# Patient Record
Sex: FEMALE | Race: WHITE | NOT HISPANIC OR LATINO | Employment: OTHER | URBAN - METROPOLITAN AREA
[De-identification: names, ages, dates, MRNs, and addresses within clinical notes are randomized per-mention and may not be internally consistent; named-entity substitution may affect disease eponyms.]

---

## 2017-03-23 ENCOUNTER — OFFICE VISIT (OUTPATIENT)
Dept: BEHAVIORAL HEALTH | Facility: PHYSICIAN GROUP | Age: 82
End: 2017-03-23
Payer: MEDICARE

## 2017-03-23 VITALS — BODY MASS INDEX: 23.6 KG/M2 | WEIGHT: 125 LBS | HEIGHT: 61 IN | RESPIRATION RATE: 14 BRPM

## 2017-03-23 DIAGNOSIS — F03.90 MAJOR NEUROCOGNITIVE DISORDER (HCC): ICD-10-CM

## 2017-03-23 DIAGNOSIS — F29 PSYCHOSIS, UNSPECIFIED PSYCHOSIS TYPE (HCC): ICD-10-CM

## 2017-03-23 PROCEDURE — 4004F PT TOBACCO SCREEN RCVD TLK: CPT | Mod: 8P | Performed by: NURSE PRACTITIONER

## 2017-03-23 PROCEDURE — 90792 PSYCH DIAG EVAL W/MED SRVCS: CPT | Performed by: NURSE PRACTITIONER

## 2017-03-23 PROCEDURE — G8432 DEP SCR NOT DOC, RNG: HCPCS | Performed by: NURSE PRACTITIONER

## 2017-03-23 RX ORDER — LATANOPROST 50 UG/ML
1 SOLUTION/ DROPS OPHTHALMIC
Refills: 0 | Status: ON HOLD | COMMUNITY
Start: 2017-02-07 | End: 2020-01-17

## 2017-03-23 RX ORDER — ACETAMINOPHEN 500 MG
500-1000 TABLET ORAL EVERY 6 HOURS PRN
Status: ON HOLD | COMMUNITY
End: 2018-07-04

## 2017-03-23 RX ORDER — ALPRAZOLAM 0.25 MG/1
.25-.375 TABLET ORAL NIGHTLY PRN
Qty: 45 TAB | Refills: 1 | Status: SHIPPED | OUTPATIENT
Start: 2017-03-23 | End: 2017-05-04

## 2017-03-23 RX ORDER — NIFEDIPINE 60 MG/1
TABLET, FILM COATED, EXTENDED RELEASE ORAL
Refills: 0 | Status: ON HOLD | COMMUNITY
Start: 2017-01-06 | End: 2018-07-04

## 2017-03-23 RX ORDER — ALPRAZOLAM 0.25 MG/1
TABLET ORAL
Refills: 0 | COMMUNITY
Start: 2017-02-17 | End: 2017-03-23 | Stop reason: SDUPTHER

## 2017-03-23 RX ORDER — MULTIVITAMIN WITH IRON
TABLET ORAL
Status: ON HOLD | COMMUNITY
End: 2018-07-04

## 2017-03-23 NOTE — PROGRESS NOTES
"BEHAVIORAL HEALTH  INITIAL PSYCHIATRIC EVALUATION    Name: Aileen Reis  MRN: [id number]  : 1927  Age: 89 y.o.  Date of assessment: 3/23/2017  PCP: Zaira Pt States None  Persons in attendance:Patient, son and daughter in law  Total face-to-face time: 60 minutes    CHIEF COMPLAINT AND HISTORY OF PRESENTING PROBLEM:   (As stated by Patient, son, and daughter in law)  Aileen Reis is a 89 y.o., Unknown female referred for assessment by No ref. provider found. Primary presenting issue includes: patient has become paranoid at night, believes people are coming into home and stealing from her.    HISTORY OF PRESENT ILLNESS:    Patient's son and daughter in law report she has problems during the night, will wake up and be frightened thinking there are people coming in and moving her things, becomes agitated and has a difficult time calming down. Symptoms have worsened since October, patient did have some prior similar symptoms for the past 2 years. Currently living with son and daughter in law. Son states he can rationalize with his mom when she is agitated, but it is making everyone have a disturbed night as she is not sleeping more than 4 hours. PCP prescribed Xanax 0.25mg, has helped patient stay asleep longer, sleeping 4 hours at a time but episodes continue. Somewhat bizarre delusions at night that people are physically coming in through the computer into the home. Frequency of episodes have increased, they are looking for a way to get her some relief in the night. Denies any symptoms of paranoia or hallucinations or delusions during the day. Patient denies feeling sad, has not been treated for mental health problems in the past. Patient states she had a good life, is not sad but does not want to be a burden for her kids or end up in a convalescent home. Patient, son, and daughter in law report she has some memory problems, but that she is \"good for almost 89 years old\". Deny problems with making poor decisions, " is not wandering or becoming agitated during the day. Patient reports her appetite is good, enjoys bowling and has started bowling again after a hip fracture. No suicidal thoughts. No history of siva or hypomania, has not had any problems with disorganized thinking or psychosis until 2 years ago.     CURRENT PSYCHIATRIC MEDS:  Xanax 0.25 mg hs prn    PAST PSYCHIATRIC MEDICATIONS TRIED:  none  FAMILY/SOCIAL HISTORY:  Does patient/parent report a family history of behavioral health issues, diagnoses, or treatment? mom had severe mental illness, likely schizophrenia, was in and out of institutions since age 30. sister has schizophrenia, brothers dementia (one Alzheimers), one sister committed suicide and one brother committed suicide.  Family History   Problem Relation Age of Onset   • Heart Attack Father    • Psychiatry Mother      in/out mental institutions   • Suicide Attempts Sister    • Psychiatry Sister    • Heart Disease Brother    • Alzheimer's Disease Brother      Current living situation/household members: lives with son Bartolo and daughter in law Sonia.  in 1991, was  for 40 years  Relevant family history/structure/dynamics: patient born in [place]. She was oldest child of 9 children, mom was severely mentally ill and patient took care of her. Father was okay, no abuse or trauma.   Quality/quantity of current family and/or social support: supportive family, very supportive   Social History     Social History   • Marital Status:      Spouse Name: N/A   • Number of Children: N/A   • Years of Education: N/A     Occupational History   • Not on file.     Social History Main Topics   • Smoking status: Unknown If Ever Smoked   • Smokeless tobacco: Not on file   • Alcohol Use: No   • Drug Use: No   • Sexual Activity: Not on file     Other Topics Concern   • Not on file     Social History Narrative       Religion PRACTICE/PREFERENCE: none    EMPLOYMENT/RESOURCES  Is the patient currently  employed? No  Does the patient/parent report adequate financial resources? Yes  Does patient identify impact of presenting issue on work functioning? n/a  Work or income-related stressors:  N/a       HISTORY:  Does patient report current or past enlistment? No    [If yes, trigger below section]  Does patient report history of exposure to combat? No  Does patient report history of  sexual trauma? No  Does patient report other -related stressors? No    SPIRITUAL/CULTURAL/IDENTITY:  What are the patient’s/family’s spiritual beliefs or practices? none  What is the patient’s cultural or ethnic background/identity? /[place]  How does the patient identify their sexual orientation?    How does the patient identify their gender? female  Does the patient identify any spiritual/cultural/identity factors as relevant to the presenting issue? son verbalizes Italians are always worried about someone stealing their things      PSYCHIATRIC TREATMENT HISTORY:  Does patient/parent report a history of outpatient psychiatric treatment for patient?   No:     Does patient/parent report a history of psychiatric hospitalizations for patient?  No:    Does patient/parent report a history of psychotherapy or other behavioral health treatment for patient?   No:      PAST MEDICAL HISTORY:    Pacemaker, history of Afib, broken femur, glaucoma      REVIEW OF SYSTEMS:      General appearance:  female, appears stated age. Stooped posture, ambulates slowly with cane. Good grooming/hygiene. 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   Ears/Nose/Mouth/Throat positive - hard of hearing   Cardiovascular positive - pacemaker   Respiratory negative   Gastrointestinal negative - denies GI upset or reduced appetite   Genitourinary negative   Muscular negative   Integumentary negative   Neurological negative - denies head injuries or seizure  history   Endocrine negative - no diabetes or thyroid problem   Hematologic/Lymphatic negative - denies history of clots or anemia        Past Medical History   Diagnosis Date   • Hypertension    • A-fib (CMS-HCC)    • Pacemaker 2015   • Syncope 2015       LABS REVIEWED: none available    Medication Allergies  Review of patient's allergies indicates no known allergies.    Medications (non psychiatric)  Current Outpatient Prescriptions   Medication Sig Dispense Refill   • Magnesium 250 MG Tab Take  by mouth.     • acetaminophen (TYLENOL) 500 MG Tab Take 500-1,000 mg by mouth every 6 hours as needed.     • vitamin D (CHOLECALCIFEROL) 1000 UNIT Tab Take 1,000 Units by mouth every day.     • alprazolam (XANAX) 0.25 MG Tab Take 1-1.5 Tabs by mouth at bedtime as needed for Sleep. 45 Tab 1   • latanoprost (XALATAN) 0.005 % Solution   0   • NIFEDICAL XL 60 MG CR tablet   0   • sotalol (BETAPACE) 80 MG TABS Take 0.5 Tabs by mouth 2 Times a Day. 60 Tab 3   • travoprost (TRAVATAN Z) 0.004 % SOLN Place 1 Drop in left eye every evening. Indications: Wide-Angle Glaucoma     • dorzolamide-timolol (COSOPT) 22.3-6.8 MG/ML SOLN Place 1 Drop in left eye 2 times a day. Indications: Wide-Angle Glaucoma       No current facility-administered medications for this visit.       DEVELOPMENTAL HISTORY:  Delivery:patient has no history about mom's pregnancy with her, but does know mom was not mentally ill when she was born   Birth weight:   Prenatal complications:  unknown   complications:  unknonw   complications:  unknown  In utero substance exposure:  unknown    Reached developmental milestones within normal limits? No history known, patient assumes all was normal   Gross motor:  Yes  Fine motor:  Yes   Speech:  Yes   Social interaction:  Yes    EDUCATIONAL:  Is patient currently enrolled in a school/educational program?   Not in school. HS graduate      Psychiatric Review of Systems:   Mood: Other: denies  "depression or feeling sad  Anxiety: Other: becomes anxious and agitated when wakes at night  Sleep: Frequent waking  Psychomotor/Energy: Other: within normal limits  Eating/Appetite: Other: within normal limits, family says her appetite is great  Cognitive: Other: poor memory at times  Behavior: Other: agitation at night   Psychosis: Visual hallucinations, Paranoia and Other: believes she smells \"gas\" or other smells, family states not there  Social: Other: engages with family well  Sensory: Other: within normal limts       Other symptoms:     LEGAL HISTORY:  Has the patient ever been involved with juvenile, adult, or family legal systems? No    Does patient report ever committing a crime? No   Does patient report every being arrested? No  Does patient report ever being a victim of a crime? No  Does patient report involvement in any current legal issues?No  Does patient report any current agency (parole/probation/CPS/) involvement? No    ABUSE/NEGLECT SCREENING:  Does patient report feeling “unsafe” in his/her home, or afraid of anyone? paranoia at night of home being broken into  Does patient report any history of physical, sexual, or emotional abuse? No  Does parent or significant other report any of the above? No  Is there evidence of neglect by self?No  Is there evidence of neglect by a caregiver? No  Does the patient/parent report any history of CPS/APS/police involvement related to suspected abuse/neglect or domestic violence? No    Based on the information provided during the current assessment, is a mandated report of suspected abuse/neglect being made?   No    SAFETY ASSESSMENT - SELF:  Does patient acknowledge current or past symptoms of dangerousness to self? No    Does parent/significant other report patient has current or past symptoms of dangerousness to self? No      History of suicide by family member: brother and sister-suicide  History of suicide by friend/significant other: " "No    Recent change in amount/specificity/intensity of suicidal thoughts or self-harm behavior?No  Current access to firearms, medications, or other identified means of suicide/self-harm? No  If yes, willing to restrict access to means of suicide/self-harm? n/a  Protective factors present: Reasons for living identified by patient: family    Current Suicide Risk: Low  Safety Plan completed, documented in chart, and copy given to patient: n/a     Crisis Safety Plan completed and copy given to patient: No     SAFETY ASSESSMENT - OTHERS:  Does patient acknowledge current or past symptoms of aggressive behavior or risk to others? No  Does parent/significant other report patient has current or past symptoms of aggressive behavior or risk to others? No      Recent change in amount/specificity/intensity of thoughts or threats to harm others? No  Current access to firearms/other identified means of harm? n/a'  If yes, willing to restrict access to weapons/means of harm? No    Current Homicide Risk: Not applicable  Crisis safety Plan completed, documented in chart, and copy given to patient? No    Based on information provided during the current assessment, is a mandated \"duty to warn\" being exercised? No    SUBSTANCE USE/ADDICTION HISTORY:  [] Not applicable - patient 10 years of age or younger    Is there a family history of substance use/addiction? No  Does patient acknowledge or parent/significant other report use of/dependence on substances? No    What consequences does the patient associate with any of the above substance use and or addictive behaviors?   None  [x] Patient denies use of any substance/addictive behaviors    STRENGTHS/ASSETS:  Strengths Identified by interviewer: Family suppport and Social skills      MENTAL STATUS EXAM/OBSERVATIONS  Resp 14  Ht 1.549 m (5' 0.98\")  Wt 56.7 kg (125 lb)  BMI 23.63 kg/m2  Breastfeeding? No  Participation: Active verbal participation, Engaged and Guarded  Grooming:  " Casual  Orientation: oriented to person, place, situation. incorrectly gives date/day of week   Eye contact:  Good  Behavior: Tense   Mood:  Euthymic  Affect: Full range and Congruent with content  Thought process: Goal-directed  Thought content: Within normal limits  Speech: Rate within normal limits and Volume within normal limits  Perception: Within normal limits  Memory:  see screen below  Insight:  Adequate  Judgment: Adequate  Other:   Family/couple interaction observations: supportive family    RESULTS OF SCREENING MEASURES:  Attempt to administer SLUMS exam, but patient is not able to do visual questions 9,10,11.  Patient performs poorly on all other parts, see scan. Difficulty with date, calculations, immediate recall and several minute recall.    CLINICAL FORMULATION: 89 year old female, seems likely to be having dementia related psychosis in the evenings. Patient somewhat poor historian, family provides a lot of collateral information for her today. Family history strong for mental illness, mother was in/out of institutions (likely schizophrenia), sister schizophrenia, brothers with dementia and Alzheimers, 2 siblings committed suicide. Patient has signs of cognitive decline, family seems to believe it is normal for her age however.  Patient reports she has had no depression, psychosis, disorganized thinking, or any problems until about 2 years ago. Currently living with family, has good support. Patient has no agitation or psychosis during the day. Clear problems with memory, calculations. No substance abuse. Patient has history of pacemaker/Afib, broken femur, and glaucoma that has seriously impaired her vision, left eye nearly blind. Hard of hearing as well. Patient has history of Alzheimer's in family, possible Alzheimer's.       DIAGNOSTIC IMPRESSION(S):  1. Psychosis, unspecified psychosis type    2. Major neurocognitive disorder         IDENTIFIED NEEDS/PLAN:  -refer to neurology to memory  clinic.  -discussed with patient and family that sometimes medication can be useful to slow progression of dementia.  -family wants a med for night relief, discussed for now we will continue Xanax low dose to help her sleep. Educated family the increased risk of death with atypical antipsychotics in patient's with dementia. Encourage family to see neurology, will consider low dose trial of Seroquel if needed and will again review risks/benefits.  -risks/benefits of Xanax discussed with patient, fall precaution given patient age    Current outpatient prescriptions:   •  Magnesium 250 MG Tab, Take  by mouth., Disp: , Rfl:   •  acetaminophen (TYLENOL) 500 MG Tab, Take 500-1,000 mg by mouth every 6 hours as needed., Disp: , Rfl:   •  vitamin D (CHOLECALCIFEROL) 1000 UNIT Tab, Take 1,000 Units by mouth every day., Disp: , Rfl:   •  alprazolam (XANAX) 0.25 MG Tab, Take 1-1.5 Tabs by mouth at bedtime as needed for Sleep., Disp: 45 Tab, Rfl: 1  •  latanoprost (XALATAN) 0.005 % Solution, , Disp: , Rfl: 0  •  NIFEDICAL XL 60 MG CR tablet, , Disp: , Rfl: 0  •  sotalol (BETAPACE) 80 MG TABS, Take 0.5 Tabs by mouth 2 Times a Day., Disp: 60 Tab, Rfl: 3  •  travoprost (TRAVATAN Z) 0.004 % SOLN, Place 1 Drop in left eye every evening. Indications: Wide-Angle Glaucoma, Disp: , Rfl:   •  dorzolamide-timolol (COSOPT) 22.3-6.8 MG/ML SOLN, Place 1 Drop in left eye 2 times a day. Indications: Wide-Angle Glaucoma, Disp: , Rfl:      Does patient express agreement with the above plan? Yes

## 2017-05-04 ENCOUNTER — OFFICE VISIT (OUTPATIENT)
Dept: BEHAVIORAL HEALTH | Facility: PHYSICIAN GROUP | Age: 82
End: 2017-05-04
Payer: MEDICARE

## 2017-05-04 DIAGNOSIS — F03.90 MAJOR NEUROCOGNITIVE DISORDER (HCC): ICD-10-CM

## 2017-05-04 DIAGNOSIS — F29 PSYCHOSIS, UNSPECIFIED PSYCHOSIS TYPE (HCC): ICD-10-CM

## 2017-05-04 PROCEDURE — 4040F PNEUMOC VAC/ADMIN/RCVD: CPT | Mod: 8P | Performed by: NURSE PRACTITIONER

## 2017-05-04 PROCEDURE — 99214 OFFICE O/P EST MOD 30 MIN: CPT | Performed by: NURSE PRACTITIONER

## 2017-05-04 PROCEDURE — 4004F PT TOBACCO SCREEN RCVD TLK: CPT | Mod: 8P | Performed by: NURSE PRACTITIONER

## 2017-05-04 PROCEDURE — 1101F PT FALLS ASSESS-DOCD LE1/YR: CPT | Mod: 8P | Performed by: NURSE PRACTITIONER

## 2017-05-04 PROCEDURE — G8432 DEP SCR NOT DOC, RNG: HCPCS | Performed by: NURSE PRACTITIONER

## 2017-05-04 PROCEDURE — G8420 CALC BMI NORM PARAMETERS: HCPCS | Performed by: NURSE PRACTITIONER

## 2017-05-04 RX ORDER — POTASSIUM CHLORIDE 750 MG/1
TABLET, FILM COATED, EXTENDED RELEASE ORAL
Status: ON HOLD | COMMUNITY
Start: 2017-03-30 | End: 2018-07-04

## 2017-05-04 RX ORDER — LEVOFLOXACIN 750 MG/1
750 TABLET, FILM COATED ORAL
Refills: 0 | Status: ON HOLD | COMMUNITY
Start: 2017-04-25 | End: 2018-07-04

## 2017-05-04 RX ORDER — POTASSIUM CHLORIDE 1500 MG/1
40 TABLET, EXTENDED RELEASE ORAL 2 TIMES DAILY
Refills: 0 | Status: ON HOLD | COMMUNITY
Start: 2017-04-25 | End: 2018-07-10

## 2017-05-04 RX ORDER — FUROSEMIDE 40 MG/1
40 TABLET ORAL 2 TIMES DAILY
Refills: 0 | Status: ON HOLD | COMMUNITY
Start: 2017-04-29 | End: 2018-07-10

## 2017-05-04 NOTE — PROGRESS NOTES
[facility]  PSYCHIATRIC FOLLOW-UP NOTE    Name: Aileen Reis  MRN: [id number]  : 1927  Age: 89 y.o.  Date of assessment: 2017  PCP: Joyce Bynum M.D.  Persons in attendance: Patient, son, and daughter in law    REASON FOR VISIT/CHIEF COMPLAINT (as stated by Patient, son, and daughter in law):  Aileen Reis is a 89 y.o., Unknown female, attending follow-up appointment for psychosis and major neurocog disorder.      HISTORY OF PRESENT ILLNESS:    Patient reports she has not been having problems, that she goes to bed around 10 pm and wakes at 4, then goes back to bed until 6, takes nap in afternoon. Son and daughter in law reports she has been less frightened at night since last visit, is not waking them saying someone has stolen her things. Son believes Aileen is just no longer telling them, Aileen denies she is feeling scared or worried at night. Daughter in law reports Aileen has poor short term memory, often uses incorrect words for everyday objects. Family reports they are no longer giving her Xanax because it did not work. Family reports they continue to try to help Aileen reality check that nobody is breaking in to move her things, that likely she has simply misplaced/forgotten where she put things.     PSYCHOSOCIAL CHANGES SINCE PREVIOUS CONTACT:  No changes, still living at son's home      MEDICATION SIDE EFFECTS: n/a    REVIEW OF SYSTEMS:      General appearance: casually dressed  female, ambulates slowly with assistance of family. cooperative  Constitutional negative - no fever/chills   Eyes not tested   Ears/Nose/Mouth/Throat Hard of hearing   Cardiovascular not tested   Respiratory not tested   Gastrointestinal negative - patient and family report appetite has been good, no GI problems   Genitourinary not tested   Muscular not tested   Integumentary not tested   Neurological positive - see HPI   Endocrine not tested   Hematologic/Lymphatic not tested       PSYCHIATRIC  EXAMINATION/MENTAL STATUS  There were no vitals taken for this visit.  Participation: Limited verbal participation and Guarded  Grooming:Casual  Orientation: Alert  Eye contact: Limited  Behavior:somewhat irritable   Mood: mildly irritable  Affect: Congruent with content  Thought process: Goal-directed  Thought content:  Within normal limits  Speech: Rate within normal limits and Volume within normal limits  Perception:  Within normal limits  Memory:  Poor memory for chronology of events  Insight: Limited  Judgment: Adequate  Family/couple interaction observations:   Other:    Current risk:    Suicide: Not applicable   Homicide: Not applicable   Self-harm: Not applicable  Relapse: Not applicable  Other:   Crisis Safety Plan reviewed?No  If evidence of imminent risk is present, intervention/plan:    Medical Records/Labs/Diagnostic Tests Reviewed: none    Medical Records/Labs/Diagnostic Tests Ordered: none    DIAGNOSTIC IMPRESSION(S):  1. Major neurocognitive disorder    2. Psychosis, unspecified psychosis type           ASSESSMENT AND PLAN: psychosis improving, neurocog symptoms persist    MA confirmed referal to neuro is complete, family can call and schedule. Discussed trial of Aricept with family and patient, family does not seem to feel it would be helpful and Aileen does not want to take another medication. Discussed we can reconsider in the future, encourage visit with neurology. Will d/c Xanax as patient has not been taking it and family reports it was not helpful. Re-educate family about dementia, that it is not normal part of aging and that medication can help slow progression in some cases as well as help with behavioral disturbances.    No meds prescribed today.      Current outpatient prescriptions:   •  furosemide (LASIX) 40 MG Tab, Take 80 mg by mouth., Disp: , Rfl: 0  •  levofloxacin (LEVAQUIN) 750 MG tablet, Take 750 mg by mouth., Disp: , Rfl: 0  •  potassium Chloride ER (K-TAB) 20 MEQ Tab CR tablet,  Take 20 mEq by mouth., Disp: , Rfl: 0  •  potassium chloride ER (KLOR-CON) 10 MEQ tablet, , Disp: , Rfl:   •  latanoprost (XALATAN) 0.005 % Solution, , Disp: , Rfl: 0  •  NIFEDICAL XL 60 MG CR tablet, , Disp: , Rfl: 0  •  Magnesium 250 MG Tab, Take  by mouth., Disp: , Rfl:   •  acetaminophen (TYLENOL) 500 MG Tab, Take 500-1,000 mg by mouth every 6 hours as needed., Disp: , Rfl:   •  vitamin D (CHOLECALCIFEROL) 1000 UNIT Tab, Take 1,000 Units by mouth every day., Disp: , Rfl:   •  sotalol (BETAPACE) 80 MG TABS, Take 0.5 Tabs by mouth 2 Times a Day., Disp: 60 Tab, Rfl: 3  •  travoprost (TRAVATAN Z) 0.004 % SOLN, Place 1 Drop in left eye every evening. Indications: Wide-Angle Glaucoma, Disp: , Rfl:   •  dorzolamide-timolol (COSOPT) 22.3-6.8 MG/ML SOLN, Place 1 Drop in left eye 2 times a day. Indications: Wide-Angle Glaucoma, Disp: , Rfl:       More than 50% of face-to-face time in this 25 minute visit was spent in psychotherapy/counseling.    Topics addressed include:    NELY Patel

## 2018-07-04 ENCOUNTER — HOSPITAL ENCOUNTER (INPATIENT)
Facility: MEDICAL CENTER | Age: 83
LOS: 9 days | DRG: 602 | End: 2018-07-13
Attending: INTERNAL MEDICINE | Admitting: INTERNAL MEDICINE
Payer: MEDICARE

## 2018-07-04 ENCOUNTER — HOSPITAL ENCOUNTER (OUTPATIENT)
Facility: MEDICAL CENTER | Age: 83
DRG: 602 | End: 2018-07-04
Payer: MEDICARE

## 2018-07-04 DIAGNOSIS — Z86.79 HISTORY OF HEART BLOCK: ICD-10-CM

## 2018-07-04 DIAGNOSIS — R54 FRAILTY: ICD-10-CM

## 2018-07-04 PROBLEM — F03.90 DEMENTIA (HCC): Status: ACTIVE | Noted: 2018-07-04

## 2018-07-04 PROBLEM — M79.89 LEFT ARM SWELLING: Status: ACTIVE | Noted: 2018-07-04

## 2018-07-04 LAB
ALBUMIN SERPL BCP-MCNC: 3.2 G/DL (ref 3.2–4.9)
ALBUMIN/GLOB SERPL: 0.7 G/DL
ALP SERPL-CCNC: 75 U/L (ref 30–99)
ALT SERPL-CCNC: 8 U/L (ref 2–50)
ANION GAP SERPL CALC-SCNC: 15 MMOL/L (ref 0–11.9)
APTT PPP: 31.3 SEC (ref 24.7–36)
AST SERPL-CCNC: 16 U/L (ref 12–45)
BILIRUB SERPL-MCNC: 0.6 MG/DL (ref 0.1–1.5)
BUN SERPL-MCNC: 56 MG/DL (ref 8–22)
CALCIUM SERPL-MCNC: 9.4 MG/DL (ref 8.5–10.5)
CHLORIDE SERPL-SCNC: 92 MMOL/L (ref 96–112)
CO2 SERPL-SCNC: 33 MMOL/L (ref 20–33)
CREAT SERPL-MCNC: 1.36 MG/DL (ref 0.5–1.4)
CRP SERPL HS-MCNC: 17.9 MG/DL (ref 0–0.75)
ERYTHROCYTE [DISTWIDTH] IN BLOOD BY AUTOMATED COUNT: 50.7 FL (ref 35.9–50)
ERYTHROCYTE [SEDIMENTATION RATE] IN BLOOD BY WESTERGREN METHOD: 109 MM/HOUR (ref 0–30)
GLOBULIN SER CALC-MCNC: 4.8 G/DL (ref 1.9–3.5)
GLUCOSE SERPL-MCNC: 112 MG/DL (ref 65–99)
HCT VFR BLD AUTO: 36.8 % (ref 37–47)
HGB BLD-MCNC: 11.8 G/DL (ref 12–16)
INR PPP: 1.25 (ref 0.87–1.13)
LACTATE BLD-SCNC: 1.5 MMOL/L (ref 0.5–2)
MAGNESIUM SERPL-MCNC: 2 MG/DL (ref 1.5–2.5)
MCH RBC QN AUTO: 28.4 PG (ref 27–33)
MCHC RBC AUTO-ENTMCNC: 32.1 G/DL (ref 33.6–35)
MCV RBC AUTO: 88.5 FL (ref 81.4–97.8)
PLATELET # BLD AUTO: 265 K/UL (ref 164–446)
PMV BLD AUTO: 9.3 FL (ref 9–12.9)
POTASSIUM SERPL-SCNC: 2.7 MMOL/L (ref 3.6–5.5)
PROT SERPL-MCNC: 8 G/DL (ref 6–8.2)
PROTHROMBIN TIME: 15.4 SEC (ref 12–14.6)
RBC # BLD AUTO: 4.16 M/UL (ref 4.2–5.4)
SODIUM SERPL-SCNC: 140 MMOL/L (ref 135–145)
WBC # BLD AUTO: 11.7 K/UL (ref 4.8–10.8)

## 2018-07-04 PROCEDURE — 700101 HCHG RX REV CODE 250: Performed by: INTERNAL MEDICINE

## 2018-07-04 PROCEDURE — 83605 ASSAY OF LACTIC ACID: CPT

## 2018-07-04 PROCEDURE — 36415 COLL VENOUS BLD VENIPUNCTURE: CPT

## 2018-07-04 PROCEDURE — 85027 COMPLETE CBC AUTOMATED: CPT

## 2018-07-04 PROCEDURE — 85652 RBC SED RATE AUTOMATED: CPT

## 2018-07-04 PROCEDURE — 85730 THROMBOPLASTIN TIME PARTIAL: CPT

## 2018-07-04 PROCEDURE — 99223 1ST HOSP IP/OBS HIGH 75: CPT | Mod: AI | Performed by: INTERNAL MEDICINE

## 2018-07-04 PROCEDURE — 700102 HCHG RX REV CODE 250 W/ 637 OVERRIDE(OP): Mod: TB | Performed by: INTERNAL MEDICINE

## 2018-07-04 PROCEDURE — 86140 C-REACTIVE PROTEIN: CPT

## 2018-07-04 PROCEDURE — 700105 HCHG RX REV CODE 258: Performed by: INTERNAL MEDICINE

## 2018-07-04 PROCEDURE — 85610 PROTHROMBIN TIME: CPT

## 2018-07-04 PROCEDURE — 80053 COMPREHEN METABOLIC PANEL: CPT

## 2018-07-04 PROCEDURE — A9270 NON-COVERED ITEM OR SERVICE: HCPCS | Mod: TB | Performed by: INTERNAL MEDICINE

## 2018-07-04 PROCEDURE — 700111 HCHG RX REV CODE 636 W/ 250 OVERRIDE (IP): Performed by: INTERNAL MEDICINE

## 2018-07-04 PROCEDURE — 770006 HCHG ROOM/CARE - MED/SURG/GYN SEMI*

## 2018-07-04 PROCEDURE — 83735 ASSAY OF MAGNESIUM: CPT

## 2018-07-04 RX ORDER — SODIUM CHLORIDE 9 MG/ML
500 INJECTION, SOLUTION INTRAVENOUS
Status: DISCONTINUED | OUTPATIENT
Start: 2018-07-04 | End: 2018-07-09

## 2018-07-04 RX ORDER — LATANOPROST 50 UG/ML
1 SOLUTION/ DROPS OPHTHALMIC EVERY EVENING
Status: DISCONTINUED | OUTPATIENT
Start: 2018-07-04 | End: 2018-07-09

## 2018-07-04 RX ORDER — SOTALOL HYDROCHLORIDE 80 MG/1
80 TABLET ORAL
Status: DISCONTINUED | OUTPATIENT
Start: 2018-07-04 | End: 2018-07-09

## 2018-07-04 RX ORDER — AMOXICILLIN 250 MG
2 CAPSULE ORAL 2 TIMES DAILY
Status: DISCONTINUED | OUTPATIENT
Start: 2018-07-04 | End: 2018-07-09 | Stop reason: DRUGHIGH

## 2018-07-04 RX ORDER — BISACODYL 10 MG
10 SUPPOSITORY, RECTAL RECTAL
Status: DISCONTINUED | OUTPATIENT
Start: 2018-07-04 | End: 2018-07-13 | Stop reason: HOSPADM

## 2018-07-04 RX ORDER — DORZOLAMIDE HYDROCHLORIDE AND TIMOLOL MALEATE 20; 5 MG/ML; MG/ML
1 SOLUTION/ DROPS OPHTHALMIC 2 TIMES DAILY
Status: DISCONTINUED | OUTPATIENT
Start: 2018-07-04 | End: 2018-07-09

## 2018-07-04 RX ORDER — POLYETHYLENE GLYCOL 3350 17 G/17G
1 POWDER, FOR SOLUTION ORAL
Status: DISCONTINUED | OUTPATIENT
Start: 2018-07-04 | End: 2018-07-13 | Stop reason: HOSPADM

## 2018-07-04 RX ORDER — CLINDAMYCIN PHOSPHATE 900 MG/50ML
900 INJECTION, SOLUTION INTRAVENOUS EVERY 8 HOURS
Status: DISCONTINUED | OUTPATIENT
Start: 2018-07-04 | End: 2018-07-05

## 2018-07-04 RX ORDER — ACETAMINOPHEN 500 MG
500-1000 TABLET ORAL EVERY 6 HOURS PRN
Status: DISCONTINUED | OUTPATIENT
Start: 2018-07-04 | End: 2018-07-04

## 2018-07-04 RX ORDER — FUROSEMIDE 40 MG/1
40 TABLET ORAL 2 TIMES DAILY
Status: DISCONTINUED | OUTPATIENT
Start: 2018-07-04 | End: 2018-07-05

## 2018-07-04 RX ORDER — NIFEDIPINE 30 MG/1
30 TABLET, EXTENDED RELEASE ORAL
Status: DISCONTINUED | OUTPATIENT
Start: 2018-07-05 | End: 2018-07-09

## 2018-07-04 RX ORDER — L. ACIDOPHILUS/L.BULGARICUS 100MM CELL
1 GRANULES IN PACKET (EA) ORAL
Status: DISCONTINUED | OUTPATIENT
Start: 2018-07-04 | End: 2018-07-09 | Stop reason: ALTCHOICE

## 2018-07-04 RX ORDER — ACETAMINOPHEN 325 MG/1
650 TABLET ORAL EVERY 6 HOURS PRN
Status: DISCONTINUED | OUTPATIENT
Start: 2018-07-04 | End: 2018-07-13 | Stop reason: HOSPADM

## 2018-07-04 RX ORDER — SODIUM CHLORIDE 9 MG/ML
INJECTION, SOLUTION INTRAVENOUS CONTINUOUS
Status: DISCONTINUED | OUTPATIENT
Start: 2018-07-04 | End: 2018-07-05

## 2018-07-04 RX ORDER — OXYCODONE HYDROCHLORIDE 10 MG/1
10 TABLET ORAL
Status: DISCONTINUED | OUTPATIENT
Start: 2018-07-04 | End: 2018-07-09

## 2018-07-04 RX ORDER — SOTALOL HYDROCHLORIDE 80 MG/1
80 TABLET ORAL
Status: ON HOLD | COMMUNITY
End: 2018-07-10

## 2018-07-04 RX ORDER — MULTIVITAMIN WITH IRON
250 TABLET ORAL DAILY
Status: DISCONTINUED | OUTPATIENT
Start: 2018-07-04 | End: 2018-07-04

## 2018-07-04 RX ORDER — POTASSIUM CHLORIDE 20 MEQ/1
40 TABLET, EXTENDED RELEASE ORAL 2 TIMES DAILY
Status: DISCONTINUED | OUTPATIENT
Start: 2018-07-04 | End: 2018-07-05

## 2018-07-04 RX ORDER — HYDROMORPHONE HYDROCHLORIDE 2 MG/ML
0.5 INJECTION, SOLUTION INTRAMUSCULAR; INTRAVENOUS; SUBCUTANEOUS
Status: DISCONTINUED | OUTPATIENT
Start: 2018-07-04 | End: 2018-07-09

## 2018-07-04 RX ORDER — NIFEDIPINE 30 MG
30 TABLET, EXTENDED RELEASE ORAL EVERY MORNING
Status: ON HOLD | COMMUNITY
End: 2020-01-17

## 2018-07-04 RX ORDER — OXYCODONE HYDROCHLORIDE 5 MG/1
5 TABLET ORAL
Status: DISCONTINUED | OUTPATIENT
Start: 2018-07-04 | End: 2018-07-09

## 2018-07-04 RX ORDER — METOLAZONE 5 MG/1
5 TABLET ORAL EVERY MORNING
Status: ON HOLD | COMMUNITY
End: 2018-07-10

## 2018-07-04 RX ADMIN — VANCOMYCIN HYDROCHLORIDE 400 MG: 100 INJECTION, POWDER, LYOPHILIZED, FOR SOLUTION INTRAVENOUS at 22:35

## 2018-07-04 RX ADMIN — POTASSIUM CHLORIDE 40 MEQ: 1500 TABLET, EXTENDED RELEASE ORAL at 20:21

## 2018-07-04 RX ADMIN — OXYCODONE HYDROCHLORIDE 5 MG: 5 TABLET ORAL at 18:49

## 2018-07-04 RX ADMIN — CLINDAMYCIN IN 5 PERCENT DEXTROSE 900 MG: 18 INJECTION, SOLUTION INTRAVENOUS at 20:25

## 2018-07-04 RX ADMIN — SODIUM CHLORIDE: 9 INJECTION, SOLUTION INTRAVENOUS at 22:35

## 2018-07-04 RX ADMIN — LACTOBACILLUS ACIDOPHILUS / LACTOBACILLUS BULGARICUS 1 PACKET: 100 MILLION CFU STRENGTH GRANULES at 20:22

## 2018-07-04 RX ADMIN — PIPERACILLIN AND TAZOBACTAM 3.38 G: 3; .375 INJECTION, POWDER, LYOPHILIZED, FOR SOLUTION INTRAVENOUS; PARENTERAL at 18:47

## 2018-07-04 RX ADMIN — PIPERACILLIN AND TAZOBACTAM 3.38 G: 3; .375 INJECTION, POWDER, LYOPHILIZED, FOR SOLUTION INTRAVENOUS; PARENTERAL at 22:37

## 2018-07-04 RX ADMIN — DORZOLAMIDE HYDROCHLORIDE AND TIMOLOL MALEATE 1 DROP: 20; 5 SOLUTION/ DROPS OPHTHALMIC at 20:24

## 2018-07-04 RX ADMIN — STANDARDIZED SENNA CONCENTRATE AND DOCUSATE SODIUM 2 TABLET: 8.6; 5 TABLET, FILM COATED ORAL at 20:22

## 2018-07-04 RX ADMIN — FUROSEMIDE 40 MG: 40 TABLET ORAL at 20:21

## 2018-07-04 RX ADMIN — SOTALOL HYDROCHLORIDE 80 MG: 80 TABLET ORAL at 20:22

## 2018-07-04 ASSESSMENT — COGNITIVE AND FUNCTIONAL STATUS - GENERAL
STANDING UP FROM CHAIR USING ARMS: A LOT
DRESSING REGULAR LOWER BODY CLOTHING: A LITTLE
WALKING IN HOSPITAL ROOM: A LOT
MOVING TO AND FROM BED TO CHAIR: A LOT
PERSONAL GROOMING: A LOT
SUGGESTED CMS G CODE MODIFIER DAILY ACTIVITY: CK
TURNING FROM BACK TO SIDE WHILE IN FLAT BAD: A LITTLE
DAILY ACTIVITIY SCORE: 16
HELP NEEDED FOR BATHING: A LOT
MOVING FROM LYING ON BACK TO SITTING ON SIDE OF FLAT BED: A LOT
DRESSING REGULAR UPPER BODY CLOTHING: A LOT
CLIMB 3 TO 5 STEPS WITH RAILING: TOTAL
TOILETING: A LITTLE

## 2018-07-04 ASSESSMENT — PAIN SCALES - GENERAL
PAINLEVEL_OUTOF10: 6
PAINLEVEL_OUTOF10: 8

## 2018-07-04 ASSESSMENT — COPD QUESTIONNAIRES
COPD SCREENING SCORE: 5
IN THE PAST 12 MONTHS DO YOU DO LESS THAN YOU USED TO BECAUSE OF YOUR BREATHING PROBLEMS: DISAGREE/UNSURE
HAVE YOU SMOKED AT LEAST 100 CIGARETTES IN YOUR ENTIRE LIFE: YES
DURING THE PAST 4 WEEKS HOW MUCH DID YOU FEEL SHORT OF BREATH: SOME OF THE TIME
DO YOU EVER COUGH UP ANY MUCUS OR PHLEGM?: NO/ONLY WITH OCCASIONAL COLDS OR INFECTIONS

## 2018-07-04 ASSESSMENT — LIFESTYLE VARIABLES
EVER_SMOKED: YES
ALCOHOL_USE: NO

## 2018-07-04 NOTE — PROGRESS NOTES
Patient is a direct admit from [facility].  Dr Tse is accepting the patient.  Dr Rodas is consulting for hand surgery.  ADT signed and held, needs to be released when the patient arrives on the unit.

## 2018-07-05 ENCOUNTER — APPOINTMENT (OUTPATIENT)
Dept: RADIOLOGY | Facility: MEDICAL CENTER | Age: 83
DRG: 602 | End: 2018-07-05
Attending: INTERNAL MEDICINE
Payer: MEDICARE

## 2018-07-05 PROBLEM — I10 HTN (HYPERTENSION): Status: ACTIVE | Noted: 2018-07-05

## 2018-07-05 PROBLEM — N28.9 RENAL INSUFFICIENCY: Status: ACTIVE | Noted: 2018-07-05

## 2018-07-05 LAB
ANION GAP SERPL CALC-SCNC: 15 MMOL/L (ref 0–11.9)
BUN SERPL-MCNC: 54 MG/DL (ref 8–22)
CALCIUM SERPL-MCNC: 9.1 MG/DL (ref 8.5–10.5)
CHLORIDE SERPL-SCNC: 94 MMOL/L (ref 96–112)
CO2 SERPL-SCNC: 31 MMOL/L (ref 20–33)
CREAT SERPL-MCNC: 1.39 MG/DL (ref 0.5–1.4)
ERYTHROCYTE [DISTWIDTH] IN BLOOD BY AUTOMATED COUNT: 51.1 FL (ref 35.9–50)
GLUCOSE SERPL-MCNC: 104 MG/DL (ref 65–99)
HCT VFR BLD AUTO: 35 % (ref 37–47)
HGB BLD-MCNC: 10.9 G/DL (ref 12–16)
MCH RBC QN AUTO: 28.2 PG (ref 27–33)
MCHC RBC AUTO-ENTMCNC: 31.1 G/DL (ref 33.6–35)
MCV RBC AUTO: 90.4 FL (ref 81.4–97.8)
PLATELET # BLD AUTO: 271 K/UL (ref 164–446)
PMV BLD AUTO: 9.3 FL (ref 9–12.9)
POTASSIUM SERPL-SCNC: 3.6 MMOL/L (ref 3.6–5.5)
RBC # BLD AUTO: 3.87 M/UL (ref 4.2–5.4)
SODIUM SERPL-SCNC: 140 MMOL/L (ref 135–145)
VANCOMYCIN SERPL-MCNC: 16.8 UG/ML
WBC # BLD AUTO: 10.3 K/UL (ref 4.8–10.8)

## 2018-07-05 PROCEDURE — 700102 HCHG RX REV CODE 250 W/ 637 OVERRIDE(OP): Mod: TB | Performed by: INTERNAL MEDICINE

## 2018-07-05 PROCEDURE — 80202 ASSAY OF VANCOMYCIN: CPT

## 2018-07-05 PROCEDURE — 36415 COLL VENOUS BLD VENIPUNCTURE: CPT

## 2018-07-05 PROCEDURE — 700105 HCHG RX REV CODE 258: Performed by: INTERNAL MEDICINE

## 2018-07-05 PROCEDURE — 73200 CT UPPER EXTREMITY W/O DYE: CPT | Mod: LT

## 2018-07-05 PROCEDURE — 80048 BASIC METABOLIC PNL TOTAL CA: CPT

## 2018-07-05 PROCEDURE — 700111 HCHG RX REV CODE 636 W/ 250 OVERRIDE (IP): Performed by: FAMILY MEDICINE

## 2018-07-05 PROCEDURE — 700102 HCHG RX REV CODE 250 W/ 637 OVERRIDE(OP): Performed by: FAMILY MEDICINE

## 2018-07-05 PROCEDURE — 700111 HCHG RX REV CODE 636 W/ 250 OVERRIDE (IP): Performed by: INTERNAL MEDICINE

## 2018-07-05 PROCEDURE — 99232 SBSQ HOSP IP/OBS MODERATE 35: CPT | Performed by: FAMILY MEDICINE

## 2018-07-05 PROCEDURE — A9270 NON-COVERED ITEM OR SERVICE: HCPCS | Mod: TB | Performed by: INTERNAL MEDICINE

## 2018-07-05 PROCEDURE — 770006 HCHG ROOM/CARE - MED/SURG/GYN SEMI*

## 2018-07-05 PROCEDURE — 700101 HCHG RX REV CODE 250: Performed by: INTERNAL MEDICINE

## 2018-07-05 PROCEDURE — 700105 HCHG RX REV CODE 258: Performed by: FAMILY MEDICINE

## 2018-07-05 PROCEDURE — 85027 COMPLETE CBC AUTOMATED: CPT

## 2018-07-05 PROCEDURE — A9270 NON-COVERED ITEM OR SERVICE: HCPCS | Performed by: FAMILY MEDICINE

## 2018-07-05 RX ORDER — HEPARIN SODIUM 5000 [USP'U]/ML
5000 INJECTION, SOLUTION INTRAVENOUS; SUBCUTANEOUS EVERY 12 HOURS
Status: DISCONTINUED | OUTPATIENT
Start: 2018-07-06 | End: 2018-07-09

## 2018-07-05 RX ORDER — DOXYCYCLINE 100 MG/1
100 TABLET ORAL EVERY 12 HOURS
Status: DISCONTINUED | OUTPATIENT
Start: 2018-07-05 | End: 2018-07-09

## 2018-07-05 RX ADMIN — SODIUM CHLORIDE 3 G: 900 INJECTION INTRAVENOUS at 23:46

## 2018-07-05 RX ADMIN — PIPERACILLIN AND TAZOBACTAM 3.38 G: 3; .375 INJECTION, POWDER, LYOPHILIZED, FOR SOLUTION INTRAVENOUS; PARENTERAL at 05:10

## 2018-07-05 RX ADMIN — LACTOBACILLUS ACIDOPHILUS / LACTOBACILLUS BULGARICUS 1 PACKET: 100 MILLION CFU STRENGTH GRANULES at 18:28

## 2018-07-05 RX ADMIN — LACTOBACILLUS ACIDOPHILUS / LACTOBACILLUS BULGARICUS 1 PACKET: 100 MILLION CFU STRENGTH GRANULES at 09:00

## 2018-07-05 RX ADMIN — NIFEDIPINE 30 MG: 30 TABLET, FILM COATED, EXTENDED RELEASE ORAL at 09:00

## 2018-07-05 RX ADMIN — LACTOBACILLUS ACIDOPHILUS / LACTOBACILLUS BULGARICUS 1 PACKET: 100 MILLION CFU STRENGTH GRANULES at 13:45

## 2018-07-05 RX ADMIN — SOTALOL HYDROCHLORIDE 80 MG: 80 TABLET ORAL at 20:48

## 2018-07-05 RX ADMIN — DOXYCYCLINE 100 MG: 100 TABLET ORAL at 20:47

## 2018-07-05 RX ADMIN — FUROSEMIDE 40 MG: 40 TABLET ORAL at 09:00

## 2018-07-05 RX ADMIN — DORZOLAMIDE HYDROCHLORIDE AND TIMOLOL MALEATE 1 DROP: 20; 5 SOLUTION/ DROPS OPHTHALMIC at 09:01

## 2018-07-05 RX ADMIN — POTASSIUM CHLORIDE 40 MEQ: 1500 TABLET, EXTENDED RELEASE ORAL at 09:00

## 2018-07-05 RX ADMIN — STANDARDIZED SENNA CONCENTRATE AND DOCUSATE SODIUM 2 TABLET: 8.6; 5 TABLET, FILM COATED ORAL at 09:01

## 2018-07-05 RX ADMIN — SODIUM CHLORIDE 3 G: 900 INJECTION INTRAVENOUS at 11:44

## 2018-07-05 RX ADMIN — DOXYCYCLINE 100 MG: 100 TABLET ORAL at 11:44

## 2018-07-05 RX ADMIN — DORZOLAMIDE HYDROCHLORIDE AND TIMOLOL MALEATE 1 DROP: 20; 5 SOLUTION/ DROPS OPHTHALMIC at 20:47

## 2018-07-05 RX ADMIN — CLINDAMYCIN IN 5 PERCENT DEXTROSE 900 MG: 18 INJECTION, SOLUTION INTRAVENOUS at 05:07

## 2018-07-05 RX ADMIN — ENOXAPARIN SODIUM 40 MG: 100 INJECTION SUBCUTANEOUS at 09:00

## 2018-07-05 RX ADMIN — OXYCODONE HYDROCHLORIDE 5 MG: 5 TABLET ORAL at 11:41

## 2018-07-05 RX ADMIN — SODIUM CHLORIDE 3 G: 900 INJECTION INTRAVENOUS at 18:28

## 2018-07-05 RX ADMIN — STANDARDIZED SENNA CONCENTRATE AND DOCUSATE SODIUM 2 TABLET: 8.6; 5 TABLET, FILM COATED ORAL at 20:48

## 2018-07-05 ASSESSMENT — COGNITIVE AND FUNCTIONAL STATUS - GENERAL
PERSONAL GROOMING: A LOT
TURNING FROM BACK TO SIDE WHILE IN FLAT BAD: A LITTLE
DRESSING REGULAR LOWER BODY CLOTHING: A LITTLE
SUGGESTED CMS G CODE MODIFIER MOBILITY: CL
HELP NEEDED FOR BATHING: A LOT
CLIMB 3 TO 5 STEPS WITH RAILING: TOTAL
MOVING FROM LYING ON BACK TO SITTING ON SIDE OF FLAT BED: A LOT
MOBILITY SCORE: 11
SUGGESTED CMS G CODE MODIFIER DAILY ACTIVITY: CK
DAILY ACTIVITIY SCORE: 16
DRESSING REGULAR UPPER BODY CLOTHING: A LOT
WALKING IN HOSPITAL ROOM: TOTAL
TOILETING: A LITTLE
MOVING TO AND FROM BED TO CHAIR: A LOT
STANDING UP FROM CHAIR USING ARMS: A LOT

## 2018-07-05 ASSESSMENT — PAIN SCALES - GENERAL
PAINLEVEL_OUTOF10: 0
PAINLEVEL_OUTOF10: 0
PAINLEVEL_OUTOF10: 8

## 2018-07-05 ASSESSMENT — PATIENT HEALTH QUESTIONNAIRE - PHQ9
SUM OF ALL RESPONSES TO PHQ9 QUESTIONS 1 AND 2: 0
2. FEELING DOWN, DEPRESSED, IRRITABLE, OR HOPELESS: NOT AT ALL
1. LITTLE INTEREST OR PLEASURE IN DOING THINGS: NOT AT ALL

## 2018-07-05 ASSESSMENT — LIFESTYLE VARIABLES: EVER_SMOKED: YES

## 2018-07-05 NOTE — PROGRESS NOTES
"Assumed care of patient at 1650 . Received report from RN. Patient is AOX4 . Assessment complete. Labs reviewed.Patient and RN discussed plan of care. Patient questions answered. Patient needs are met at this time. Bed in lowest and locked position. Upper bed rails up.  Call light is within reach. Hourly rounding in place.  /66   Pulse 62   Temp 36.6 °C (97.9 °F)   Resp 20   Ht 1.52 m (4' 11.84\")   Wt 48.3 kg (106 lb 7.7 oz)   SpO2 96%   BMI 20.91 kg/m²   Direct admit  "

## 2018-07-05 NOTE — CONSULTS
7/5/2018    Reason for consultation:  Left hand, wrist, forearm pain; concern for necrotizing fasciitis    Inpatient consultation at the request of Suki Plummer and Francis for left hand, wrist, and forearm pain.  Aileen Reis is a 89 y.o. female who was directly admitted to [facility] for concern of necrotizing fasciitis.  History is limited due to her dementia, but she indicates her hand, wrist, and forearm began hurting 3 days ago, and she has noticed limited motion since that time.  Her pain is mostly dorsal, and seems mostly centralized in the hand.  Her ESR and CRP are elevated, but there are no other lab changes particularly worrisome for necrotizing fasciitis.  They were evaluated in the ER, and Orthopedics was consulted. Patient denies numbness, paresthesias, fevers, chills, or other symptoms.    Past Medical History:   Diagnosis Date   • A-fib (HCC)    • Hypertension    • Pacemaker 6/2/2015   • Syncope 5/24/2015       Past Surgical History:   Procedure Laterality Date   • OTHER CARDIAC SURGERY     • OTHER ORTHOPEDIC SURGERY      L wrist, broke while roller skating and L hip       Medications  No current facility-administered medications on file prior to encounter.      Current Outpatient Prescriptions on File Prior to Encounter   Medication Sig Dispense Refill   • furosemide (LASIX) 40 MG Tab Take 40 mg by mouth 2 Times a Day.  0   • potassium Chloride ER (K-TAB) 20 MEQ Tab CR tablet Take 40 mEq by mouth 2 Times a Day.  0   • latanoprost (XALATAN) 0.005 % Solution Place 1 Drop in left eye every bedtime.  0   • dorzolamide-timolol (COSOPT) 22.3-6.8 MG/ML SOLN Place 1 Drop in left eye 2 times a day. Indications: Wide-Angle Glaucoma         Allergies  Patient has no known allergies.    ROS  Per HPI. All other systems were reviewed and found to be negative    Family History   Problem Relation Age of Onset   • Suicide Attempts Sister    • Psychiatry Sister    • Heart Disease Brother    • Alzheimer's Disease Brother   "  • Heart Attack Father    • Psychiatry Mother      in/out mental institutions       Social History     Social History   • Marital status:      Spouse name: N/A   • Number of children: N/A   • Years of education: N/A     Social History Main Topics   • Smoking status: Former Smoker     Types: Cigarettes   • Smokeless tobacco: Never Used   • Alcohol use No   • Drug use: No   • Sexual activity: Not on file     Other Topics Concern   • Not on file     Social History Narrative   • No narrative on file       Physical Exam  Vitals  Blood pressure 116/53, pulse 64, temperature 36.1 °C (97 °F), resp. rate 16, height 1.52 m (4' 11.84\"), weight 48.3 kg (106 lb 7.7 oz), SpO2 91 %, not currently breastfeeding.  General: Well Developed, Well Nourished, no acute distress  Psychiatric: Alert and oriented x3, appropriate responses to questions, pleasant mood and affect.  HEENT: Normocephalic, atraumatic  Eyes: Anicteric, PERRLA, EOMI  Neck: Supple, nontender, no masses  Chest: Symmetric expansion of the chest wall, non-tender to palpation, no distress.  Heart: RRR, palpable peripheral pulses  Abdomen: Soft, NT, ND  Skin: Intact, no open wounds.  Mild erythema diffusely about hand, dorsal wrist.  Extremities: Pain with palpation of the dorsum of the hand.  Minimal apparent pain with ROM of the wrist.  During passive ROM testing, she complains mostly of the pressure on the dorsum of the hand.  Passive finger flexion/extension is not particularly painful.  Neuro: Intact light touch and motors in median/radial/ulnar left upper extremity  Vascular: 2+ radial pulse, Capillary refill <2 seconds    Radiographs:  CT-EXTREMITY, UPPER W/O LEFT   Final Result      1.  No acute fracture or dislocation appreciated.      2.  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      3.  No soft tissue fluid collection or mass appreciated.          Laboratory Values  Recent Labs      07/04/18 1816  " 07/05/18   0508   WBC  11.7*  10.3   RBC  4.16*  3.87*   HEMOGLOBIN  11.8*  10.9*   HEMATOCRIT  36.8*  35.0*   MCV  88.5  90.4   MCH  28.4  28.2   MCHC  32.1*  31.1*   RDW  50.7*  51.1*   PLATELETCT  265  271   MPV  9.3  9.3     Recent Labs      07/04/18   1816  07/05/18   0154   SODIUM  140  140   POTASSIUM  2.7*  3.6   CHLORIDE  92*  94*   CO2  33  31   GLUCOSE  112*  104*   BUN  56*  54*     Recent Labs      07/04/18 1816   APTT  31.3   INR  1.25*         Impression:    #1 Left hand cellulitis  #2 Left wrist degenerative changes    Plan:    Her appearance is concerning for cellulitis, but she does not have many signs of necrotizing fasciitis.  At this time, medical management is appropriate, with antibiotics for her cellulitis.    With regard to her wrist, these changes appear chronic.  We would recommend treating this medically for now, and observe for signs of improvement.  If not improving over a few days, could consider repeat imaging.

## 2018-07-05 NOTE — RESPIRATORY CARE
COPD EDUCATION by COPD CLINICAL EDUCATOR  7/5/2018 at 7:33 AM by Natacha River     Patient reviewed by COPD education team. Patient does not qualify for COPD program at this time

## 2018-07-05 NOTE — ASSESSMENT & PLAN NOTE
CT of wrist showed:1.  No acute fracture or dislocation appreciated.    2.  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    3.  No soft tissue fluid collection or mass appreciated.  Completed 5 days of antibiotics  Ortho input is noted  Has resolved

## 2018-07-05 NOTE — PROGRESS NOTES
[facility] Hospitalist Progress Note    Date of Service: 2018    Chief Complaint  89 y.o. female admitted 2018 with left wrist cellulitis    Interval Problem Update  ?of necrotizing facitis    Consultants/Specialty  Called ortho for consult    Disposition  pending        Review of Systems   Unable to perform ROS: Dementia      Physical Exam  Laboratory/Imaging   Hemodynamics  Temp (24hrs), Av.3 °C (97.3 °F), Min:36.1 °C (96.9 °F), Max:36.6 °C (97.9 °F)   Temperature: 36.1 °C (97 °F)  Pulse  Av.3  Min: 62  Max: 82    Blood Pressure : 116/53      Respiratory      Respiration: 16, Pulse Oximetry: 91 %        RUL Breath Sounds: Diminished, RML Breath Sounds: Diminished, RLL Breath Sounds: Diminished, SUDHIR Breath Sounds: Diminished, LLL Breath Sounds: Diminished    Fluids  No intake or output data in the 24 hours ending 18 0927    Nutrition  Orders Placed This Encounter   Procedures   • Diet Order Regular     Standing Status:   Standing     Number of Occurrences:   1     Order Specific Question:   Diet:     Answer:   Regular [1]     Order Specific Question:   Texture/Fiber modifications:     Answer:   Chopped Meat [5]     Physical Exam   Constitutional: No distress.   HENT:   Head: Normocephalic and atraumatic.   Eyes: Conjunctivae are normal. Pupils are equal, round, and reactive to light.   Neck: Normal range of motion. Neck supple.   Cardiovascular: Normal rate and regular rhythm.    Pulmonary/Chest: Effort normal and breath sounds normal.   Abdominal: Soft. Bowel sounds are normal.   Musculoskeletal: She exhibits edema (left wrist and hand is warm to touch) and tenderness (left wrist).   Neurological: She is alert.   Skin: Skin is warm and dry. She is not diaphoretic.       Recent Labs      18   1816  18   0508   WBC  11.7*  10.3   RBC  4.16*  3.87*   HEMOGLOBIN  11.8*  10.9*   HEMATOCRIT  36.8*  35.0*   MCV  88.5  90.4   MCH  28.4  28.2   MCHC  32.1*  31.1*   RDW  50.7*  51.1*  "  PLATELETCT  265  271   MPV  9.3  9.3     Recent Labs      07/04/18   1816  07/05/18   0154   SODIUM  140  140   POTASSIUM  2.7*  3.6   CHLORIDE  92*  94*   CO2  33  31   GLUCOSE  112*  104*   BUN  56*  54*   CREATININE  1.36  1.39   CALCIUM  9.4  9.1     Recent Labs      07/04/18   1816   APTT  31.3   INR  1.25*                  Assessment/Plan     * Left arm swelling   Assessment & Plan    89-year-old female, coming from [facility] ED with severe and marked in swelling of the left arm, hand, palms.  Imaging there showed \"osteoitis\" of the radial ulnar joint.  Increased WBC on labs.  ED physician cannot rule out necrotizing fasciitis. Given antibiotics (vancomycin and Zosyn) prior to transfer. Dr. Rodas hand surgeon's office    CT of wrist is pending(has pacer)  On zosyn  On doxycycline  No vancomycin 2nd renal insuffiency  Called ortho on call and left a message for consult          History of heart block s/p pacer- (present on admission)   Assessment & Plan    Noted.   Obtain admission EKG  Stable.        HTN (hypertension)   Assessment & Plan    nifidipine        Renal insufficiency   Assessment & Plan    2nd to overdiuresis  Hold lasix        Dementia   Assessment & Plan    Cognitive deficits  Monitor for delirium        Hypokalemia- (present on admission)   Assessment & Plan    Has resolved          Quality-Core Measures   DVT prophylaxis pharmacological::  Heparin        "

## 2018-07-05 NOTE — CARE PLAN
Problem: Safety  Goal: Will remain free from injury  Pt a high fall risk for falls. All precautions in place. At times confused at night. Frequency. Offering scheduled toileting. Call light with in reach requires reinforcement.     Problem: Venous Thromboembolism (VTW)/Deep Vein Thrombosis (DVT) Prevention:  Goal: Patient will participate in Venous Thrombosis (VTE)/Deep Vein Thrombosis (DVT)Prevention Measures  Pt on lovenox per prophylaxis.

## 2018-07-05 NOTE — THERAPY
Pt pending ortho consult, hold PT eval until clear POC in place.      Carlie Atkinson PT/DPT  Pager# [phone]

## 2018-07-05 NOTE — ASSESSMENT & PLAN NOTE
2nd to overdiuresis  D/w the son with  in the room  Pt's son wants comfort care to be cancelled and  Only to be DNR/DNI  At base line

## 2018-07-05 NOTE — PROGRESS NOTES
"Pharmacy Kinetics 89 y.o. female on vancomycin day # 1 2018    Currently on Vancomycin new start - pt was given 800 mg dose  @ 1235 prior to arrival    Indication for Treatment: SSTI - left hand cellulitis    Pertinent history per medical record: Admitted as direct admit from [facility] on 2018 for plastic surgery consult.  Notes still minimal at this time.  Pt may be going for surgery tonight.  Confirmed with RN that pt received 800 mg earlier today.    Other antibiotics: zosyn 4.5 g IV q8h, clindamycin 900 mg IV q8h    Allergies: Patient has no known allergies.     List concerns for renal function:    - renal dysfunction: SCr 1.36, CrCl 21 ml/hr (based on ActBW), BUN 56   - advanced age: 89 y/o    Pertinent cultures to date:   none    Recent Labs      18   181   WBC  11.7*     Recent Labs      18   BUN  56*   CREATININE  1.36   ALBUMIN  3.2     Blood pressure 149/66, pulse 62, temperature 36.6 °C (97.9 °F), resp. rate 20, height 1.52 m (4' 11.84\"), weight 48.3 kg (106 lb 7.7 oz), SpO2 96 %. Temp (24hrs), Av.6 °C (97.9 °F), Min:36.6 °C (97.9 °F), Max:36.6 °C (97.9 °F)      A/P   1. Vancomycin dose change: 400 mg x 1  2. Next vancomycin level: 7/6 w/ am labs  3. Goal trough: 12-16 mcg/ml  4. Previous vancomycin treatment: no  5. MRSA nares swab: n/a  6. Comments: significant concern for renal function given current CrCl.  Pt received 800 mg (17 mg/kg) prior to arrival.  Will give an additional 400 mg now to complete 25 mg/kg load.  Will plan to check a level on 76 with am labs.  Pharmacy will monitor and redose when appropriate.      Allison Smith, PharmD    "

## 2018-07-05 NOTE — H&P
"Hospital Medicine History and Physical    Date of Service  7/4/2018    Chief Complaint  No chief complaint on file.  L arm swelling    History of Presenting Illness  89 y.o. female who presented 7/4/2018 with 2 day history of L hand, wrist and forearm swelling.  She has cognitive deficits but did give bits and pieces of her history.  She actually confirmed she has had the swelling and pain for 2 days now, 2 days ago she just woke up with it. She denied trauma or falls. She tells me she lives with her son at home. She gets around with a walker.   She was seen at outside hospital and was worked up. Reportedly she has a leukocytosis and Xrays showed possible wrist osteitis and osteophytes. Xrays suggest old or healed fractures there. ED doctor there had \"concern for necrotizing fasciitis'. Hospitalist team called for direct admission. Dr. Rodas, Hand surgeon alerted of the transfer.   She did say she has a history of   At the medical floor, she was in no acute distress. Cogntive deficits, pleasant. L, hand, wrist and forearm swollen with soft fluctuant swelling dorsum of hand, and palpable wrist mass. Can move fingers but rom limited. Tenderness to palpation but not warm, not erythematous.  Primary Care Physician  Joyce Bynum M.D.    Consultants  Dr. Rodas, Plastic Surgery  Orthopedics    Code Status  Full    Review of Systems  Review of Systems   Unable to perform ROS: Mental acuity        Past Medical History  Past Medical History:   Diagnosis Date   • Pacemaker 6/2/2015   • Syncope 5/24/2015   • A-fib (HCC)    • Hypertension        Surgical History  No past surgical history on file.    Medications  No current facility-administered medications on file prior to encounter.      Current Outpatient Prescriptions on File Prior to Encounter   Medication Sig Dispense Refill   • furosemide (LASIX) 40 MG Tab Take 80 mg by mouth.  0   • levofloxacin (LEVAQUIN) 750 MG tablet Take 750 mg by mouth.  0   • potassium Chloride ER " (K-TAB) 20 MEQ Tab CR tablet Take 20 mEq by mouth.  0   • potassium chloride ER (KLOR-CON) 10 MEQ tablet      • latanoprost (XALATAN) 0.005 % Solution   0   • NIFEDICAL XL 60 MG CR tablet   0   • Magnesium 250 MG Tab Take  by mouth.     • acetaminophen (TYLENOL) 500 MG Tab Take 500-1,000 mg by mouth every 6 hours as needed.     • vitamin D (CHOLECALCIFEROL) 1000 UNIT Tab Take 1,000 Units by mouth every day.     • sotalol (BETAPACE) 80 MG TABS Take 0.5 Tabs by mouth 2 Times a Day. 60 Tab 3   • travoprost (TRAVATAN Z) 0.004 % SOLN Place 1 Drop in left eye every evening. Indications: Wide-Angle Glaucoma     • dorzolamide-timolol (COSOPT) 22.3-6.8 MG/ML SOLN Place 1 Drop in left eye 2 times a day. Indications: Wide-Angle Glaucoma         Family History  Family History   Problem Relation Age of Onset   • Suicide Attempts Sister    • Psychiatry Sister    • Heart Disease Brother    • Alzheimer's Disease Brother    • Heart Attack Father    • Psychiatry Mother      in/out mental institutions       Social History  Social History   Substance Use Topics   • Smoking status: Unknown If Ever Smoked   • Smokeless tobacco: Not on file   • Alcohol use No       Allergies  No Known Allergies     Physical Exam  Laboratory   Hemodynamics  Temp (24hrs), Av.6 °C (97.9 °F), Min:36.6 °C (97.9 °F), Max:36.6 °C (97.9 °F)   Temperature: 36.6 °C (97.9 °F)  Pulse  Av  Min: 62  Max: 62    Blood Pressure : 149/66      Respiratory      Respiration: 20, Pulse Oximetry: 96 %        RUL Breath Sounds: Diminished, RML Breath Sounds: Diminished, RLL Breath Sounds: Diminished, SUDHIR Breath Sounds: Diminished, LLL Breath Sounds: Diminished    Physical Exam   Constitutional: She appears well-developed and well-nourished.   Elderly, frail     HENT:   Head: Normocephalic and atraumatic.   Eyes: Conjunctivae and EOM are normal. No scleral icterus.   Neck: Normal range of motion. Neck supple.   Cardiovascular: Normal rate and regular rhythm.  Exam  "reveals no gallop and no friction rub.    No murmur heard.  Pulmonary/Chest: Effort normal and breath sounds normal. No respiratory distress. She has no wheezes. She has no rales.   Abdominal: Soft. Bowel sounds are normal. She exhibits no distension. There is no tenderness. There is no rebound and no guarding.   Musculoskeletal: She exhibits edema, tenderness and deformity.   L wrist, hand and forearm  No erythema  Limited ROM of L hand   Neurological: She is alert.   Cognitive deficits   Skin: Skin is warm.   Psychiatric: She has a normal mood and affect. Her behavior is normal.       Recent Labs      07/04/18 1816   WBC  11.7*   RBC  4.16*   HEMOGLOBIN  11.8*   HEMATOCRIT  36.8*   MCV  88.5   MCH  28.4   MCHC  32.1*   RDW  50.7*   PLATELETCT  265   MPV  9.3         No results for input(s): ALTSGPT, ASTSGOT, ALKPHOSPHAT, TBILIRUBIN, DBILIRUBIN, GAMMAGT, AMYLASE, LIPASE, ALB, PREALBUMIN, GLUCOSE in the last 72 hours.  Recent Labs      07/04/18 1816   APTT  31.3   INR  1.25*             Lab Results   Component Value Date    TROPONINI 0.11 (H) 05/23/2015     Urinalysis:    Lab Results  Component Value Date/Time   SPECGRAVITY 1.004 05/29/2015 0305   GLUCOSEUR Negative 05/29/2015 0305   KETONES Negative 05/29/2015 0305   NITRITE Negative 05/29/2015 0305   WBCURINE 5-10 (A) 05/29/2015 0305   RBCURINE 2-5 (A) 05/29/2015 0305        Imaging  No results found.   Assessment/Plan     I anticipate this patient will require at least two midnights for appropriate medical management, necessitating inpatient admission.    * Left arm swelling   Assessment & Plan    89-year-old female, coming from [facility] ED with severe and marked in swelling of the left arm, hand, palms.  Imaging there showed \"osteoitis\" of the radial ulnar joint.  Increased WBC on labs.  ED physician cannot rule out necrotizing fasciitis. Given antibiotics (vancomycin and Zosyn) prior to transfer. Dr. Rodas, hand surgeon contacted.  I spoke with  " [facility]. I spoke with transfer center regarding consultants and deescalated situation.  I ordered CT of wrist (has pacer)  I consulted Orthopedics  Continue antibiotics for now.  Currently no plans for surgical intervention          History of heart block s/p pacer- (present on admission)   Assessment & Plan    Noted.   Obtain admission EKG  Stable.        Dementia   Assessment & Plan    Cognitive deficits  Monitor for delirium        Hypokalemia- (present on admission)   Assessment & Plan    I ordered labs  Kame back hypokalemic  Ordered replacement  Ordered Mg level            VTE prophylaxis: Pharmacologic    I spent 80 minutes, reviewing the chart, notes, vitals, labs, imaging, ordering labs, evaluating Aileen Reis for assessment, enacting the plan above. 50% of the time was spent in counseling Aileen Reis and answering questions. Discussed with Magno Rowland, Orthopedics, RN, patient. I spoke with transfer center regarding consultant and deescalated situation. Medical decision making is therefore complex. Time was devoted to counseling and coordinating care including review of records, pertinent lab data and studies, as well as discussing diagnostic evaluation and work up, planned therapeutic interventions and future disposition of care. Where indicated, the assessment and plan reflect discussion of patient with consultants, other healthcare providers, family members, and additional research needed to obtain further information in formulating the plan of care for Aileen Reis.

## 2018-07-05 NOTE — ASSESSMENT & PLAN NOTE
Chronic  sotolol is dced 2nd to renal failure  D/w cardiology  And agreed with dcing sotolol and starting lopressor

## 2018-07-05 NOTE — PROGRESS NOTES
Art from Lab called with critical result of low potassium at 1922. Critical lab result read back to art.   Dr. Plummer notified of critical lab result at 1926.  Critical lab result read back by Dr. Plummer.

## 2018-07-05 NOTE — PROGRESS NOTES
Paged Dr. Kelley regarding consult for possible hand surgery tonight. Dr. Rodas returned call and stated he was not familiar with the patient and to have the hospitalist call them.

## 2018-07-06 LAB
ALBUMIN SERPL BCP-MCNC: 2.7 G/DL (ref 3.2–4.9)
ALBUMIN/GLOB SERPL: 0.6 G/DL
ALP SERPL-CCNC: 61 U/L (ref 30–99)
ALT SERPL-CCNC: <5 U/L (ref 2–50)
ANION GAP SERPL CALC-SCNC: 11 MMOL/L (ref 0–11.9)
AST SERPL-CCNC: 13 U/L (ref 12–45)
BASOPHILS # BLD AUTO: 0.3 % (ref 0–1.8)
BASOPHILS # BLD: 0.03 K/UL (ref 0–0.12)
BILIRUB SERPL-MCNC: 0.6 MG/DL (ref 0.1–1.5)
BUN SERPL-MCNC: 32 MG/DL (ref 8–22)
CALCIUM SERPL-MCNC: 8.9 MG/DL (ref 8.5–10.5)
CHLORIDE SERPL-SCNC: 88 MMOL/L (ref 96–112)
CO2 SERPL-SCNC: 34 MMOL/L (ref 20–33)
CREAT SERPL-MCNC: 1.06 MG/DL (ref 0.5–1.4)
EOSINOPHIL # BLD AUTO: 0.16 K/UL (ref 0–0.51)
EOSINOPHIL NFR BLD: 1.4 % (ref 0–6.9)
ERYTHROCYTE [DISTWIDTH] IN BLOOD BY AUTOMATED COUNT: 49.9 FL (ref 35.9–50)
GLOBULIN SER CALC-MCNC: 4.5 G/DL (ref 1.9–3.5)
GLUCOSE SERPL-MCNC: 106 MG/DL (ref 65–99)
HCT VFR BLD AUTO: 34.3 % (ref 37–47)
HGB BLD-MCNC: 11.1 G/DL (ref 12–16)
IMM GRANULOCYTES # BLD AUTO: 0.07 K/UL (ref 0–0.11)
IMM GRANULOCYTES NFR BLD AUTO: 0.6 % (ref 0–0.9)
LYMPHOCYTES # BLD AUTO: 1.11 K/UL (ref 1–4.8)
LYMPHOCYTES NFR BLD: 9.6 % (ref 22–41)
MCH RBC QN AUTO: 28.8 PG (ref 27–33)
MCHC RBC AUTO-ENTMCNC: 32.4 G/DL (ref 33.6–35)
MCV RBC AUTO: 88.9 FL (ref 81.4–97.8)
MONOCYTES # BLD AUTO: 0.99 K/UL (ref 0–0.85)
MONOCYTES NFR BLD AUTO: 8.5 % (ref 0–13.4)
NEUTROPHILS # BLD AUTO: 9.22 K/UL (ref 2–7.15)
NEUTROPHILS NFR BLD: 79.6 % (ref 44–72)
NRBC # BLD AUTO: 0 K/UL
NRBC BLD-RTO: 0 /100 WBC
PLATELET # BLD AUTO: 264 K/UL (ref 164–446)
PMV BLD AUTO: 9.4 FL (ref 9–12.9)
POTASSIUM SERPL-SCNC: 2.5 MMOL/L (ref 3.6–5.5)
PROT SERPL-MCNC: 7.2 G/DL (ref 6–8.2)
RBC # BLD AUTO: 3.86 M/UL (ref 4.2–5.4)
SODIUM SERPL-SCNC: 133 MMOL/L (ref 135–145)
WBC # BLD AUTO: 11.6 K/UL (ref 4.8–10.8)

## 2018-07-06 PROCEDURE — 99232 SBSQ HOSP IP/OBS MODERATE 35: CPT | Performed by: FAMILY MEDICINE

## 2018-07-06 PROCEDURE — 700111 HCHG RX REV CODE 636 W/ 250 OVERRIDE (IP): Performed by: INTERNAL MEDICINE

## 2018-07-06 PROCEDURE — 85025 COMPLETE CBC W/AUTO DIFF WBC: CPT

## 2018-07-06 PROCEDURE — 700102 HCHG RX REV CODE 250 W/ 637 OVERRIDE(OP): Performed by: FAMILY MEDICINE

## 2018-07-06 PROCEDURE — G8978 MOBILITY CURRENT STATUS: HCPCS | Mod: CL

## 2018-07-06 PROCEDURE — 700105 HCHG RX REV CODE 258: Performed by: FAMILY MEDICINE

## 2018-07-06 PROCEDURE — 700102 HCHG RX REV CODE 250 W/ 637 OVERRIDE(OP): Performed by: INTERNAL MEDICINE

## 2018-07-06 PROCEDURE — 770006 HCHG ROOM/CARE - MED/SURG/GYN SEMI*

## 2018-07-06 PROCEDURE — 80053 COMPREHEN METABOLIC PANEL: CPT

## 2018-07-06 PROCEDURE — G8979 MOBILITY GOAL STATUS: HCPCS | Mod: CI

## 2018-07-06 PROCEDURE — A9270 NON-COVERED ITEM OR SERVICE: HCPCS | Mod: TB | Performed by: INTERNAL MEDICINE

## 2018-07-06 PROCEDURE — 36415 COLL VENOUS BLD VENIPUNCTURE: CPT

## 2018-07-06 PROCEDURE — G8988 SELF CARE GOAL STATUS: HCPCS | Mod: CJ

## 2018-07-06 PROCEDURE — G8987 SELF CARE CURRENT STATUS: HCPCS | Mod: CL

## 2018-07-06 PROCEDURE — A9270 NON-COVERED ITEM OR SERVICE: HCPCS | Performed by: FAMILY MEDICINE

## 2018-07-06 PROCEDURE — 700102 HCHG RX REV CODE 250 W/ 637 OVERRIDE(OP): Mod: TB | Performed by: INTERNAL MEDICINE

## 2018-07-06 PROCEDURE — A9270 NON-COVERED ITEM OR SERVICE: HCPCS | Performed by: INTERNAL MEDICINE

## 2018-07-06 PROCEDURE — 700111 HCHG RX REV CODE 636 W/ 250 OVERRIDE (IP): Performed by: FAMILY MEDICINE

## 2018-07-06 PROCEDURE — 97162 PT EVAL MOD COMPLEX 30 MIN: CPT

## 2018-07-06 PROCEDURE — 97166 OT EVAL MOD COMPLEX 45 MIN: CPT

## 2018-07-06 RX ORDER — POTASSIUM CHLORIDE 20 MEQ/1
40 TABLET, EXTENDED RELEASE ORAL ONCE
Status: DISCONTINUED | OUTPATIENT
Start: 2018-07-06 | End: 2018-07-06

## 2018-07-06 RX ORDER — POTASSIUM CHLORIDE 20 MEQ/1
40 TABLET, EXTENDED RELEASE ORAL 2 TIMES DAILY
Status: COMPLETED | OUTPATIENT
Start: 2018-07-06 | End: 2018-07-06

## 2018-07-06 RX ORDER — POTASSIUM CHLORIDE 7.45 MG/ML
10 INJECTION INTRAVENOUS
Status: COMPLETED | OUTPATIENT
Start: 2018-07-06 | End: 2018-07-06

## 2018-07-06 RX ORDER — POTASSIUM CHLORIDE 20 MEQ/1
40 TABLET, EXTENDED RELEASE ORAL 2 TIMES DAILY
Status: DISCONTINUED | OUTPATIENT
Start: 2018-07-06 | End: 2018-07-06

## 2018-07-06 RX ADMIN — POTASSIUM CHLORIDE 10 MEQ: 7.46 INJECTION, SOLUTION INTRAVENOUS at 09:14

## 2018-07-06 RX ADMIN — LACTOBACILLUS ACIDOPHILUS / LACTOBACILLUS BULGARICUS 1 PACKET: 100 MILLION CFU STRENGTH GRANULES at 11:40

## 2018-07-06 RX ADMIN — DOXYCYCLINE 100 MG: 100 TABLET ORAL at 09:13

## 2018-07-06 RX ADMIN — LACTOBACILLUS ACIDOPHILUS / LACTOBACILLUS BULGARICUS 1 PACKET: 100 MILLION CFU STRENGTH GRANULES at 17:29

## 2018-07-06 RX ADMIN — SODIUM CHLORIDE 3 G: 900 INJECTION INTRAVENOUS at 17:29

## 2018-07-06 RX ADMIN — SOTALOL HYDROCHLORIDE 80 MG: 80 TABLET ORAL at 21:08

## 2018-07-06 RX ADMIN — SODIUM CHLORIDE 3 G: 900 INJECTION INTRAVENOUS at 11:40

## 2018-07-06 RX ADMIN — LATANOPROST 1 DROP: 50 SOLUTION OPHTHALMIC at 21:08

## 2018-07-06 RX ADMIN — DORZOLAMIDE HYDROCHLORIDE AND TIMOLOL MALEATE 1 DROP: 20; 5 SOLUTION/ DROPS OPHTHALMIC at 21:09

## 2018-07-06 RX ADMIN — POTASSIUM CHLORIDE 10 MEQ: 7.46 INJECTION, SOLUTION INTRAVENOUS at 09:00

## 2018-07-06 RX ADMIN — HEPARIN SODIUM 5000 UNITS: 5000 INJECTION, SOLUTION INTRAVENOUS; SUBCUTANEOUS at 21:08

## 2018-07-06 RX ADMIN — NIFEDIPINE 30 MG: 30 TABLET, FILM COATED, EXTENDED RELEASE ORAL at 17:28

## 2018-07-06 RX ADMIN — STANDARDIZED SENNA CONCENTRATE AND DOCUSATE SODIUM 2 TABLET: 8.6; 5 TABLET, FILM COATED ORAL at 21:08

## 2018-07-06 RX ADMIN — DORZOLAMIDE HYDROCHLORIDE AND TIMOLOL MALEATE 1 DROP: 20; 5 SOLUTION/ DROPS OPHTHALMIC at 09:15

## 2018-07-06 RX ADMIN — STANDARDIZED SENNA CONCENTRATE AND DOCUSATE SODIUM 2 TABLET: 8.6; 5 TABLET, FILM COATED ORAL at 09:43

## 2018-07-06 RX ADMIN — POTASSIUM CHLORIDE 40 MEQ: 1500 TABLET, EXTENDED RELEASE ORAL at 21:07

## 2018-07-06 RX ADMIN — HEPARIN SODIUM 5000 UNITS: 5000 INJECTION, SOLUTION INTRAVENOUS; SUBCUTANEOUS at 09:13

## 2018-07-06 RX ADMIN — SODIUM CHLORIDE 3 G: 900 INJECTION INTRAVENOUS at 06:05

## 2018-07-06 RX ADMIN — POTASSIUM CHLORIDE 40 MEQ: 1500 TABLET, EXTENDED RELEASE ORAL at 09:13

## 2018-07-06 RX ADMIN — LACTOBACILLUS ACIDOPHILUS / LACTOBACILLUS BULGARICUS 1 PACKET: 100 MILLION CFU STRENGTH GRANULES at 09:13

## 2018-07-06 RX ADMIN — POTASSIUM CHLORIDE 10 MEQ: 7.46 INJECTION, SOLUTION INTRAVENOUS at 11:10

## 2018-07-06 RX ADMIN — DOXYCYCLINE 100 MG: 100 TABLET ORAL at 21:08

## 2018-07-06 ASSESSMENT — COGNITIVE AND FUNCTIONAL STATUS - GENERAL
MOVING FROM LYING ON BACK TO SITTING ON SIDE OF FLAT BED: A LOT
STANDING UP FROM CHAIR USING ARMS: A LOT
DRESSING REGULAR LOWER BODY CLOTHING: A LOT
CLIMB 3 TO 5 STEPS WITH RAILING: TOTAL
MOVING TO AND FROM BED TO CHAIR: A LOT
SUGGESTED CMS G CODE MODIFIER DAILY ACTIVITY: CK
DAILY ACTIVITIY SCORE: 14
PERSONAL GROOMING: A LOT
TOILETING: A LOT
EATING MEALS: A LITTLE
HELP NEEDED FOR BATHING: A LOT
MOBILITY SCORE: 11
WALKING IN HOSPITAL ROOM: A LOT
TURNING FROM BACK TO SIDE WHILE IN FLAT BAD: A LOT
DRESSING REGULAR UPPER BODY CLOTHING: A LITTLE
SUGGESTED CMS G CODE MODIFIER MOBILITY: CL

## 2018-07-06 ASSESSMENT — ACTIVITIES OF DAILY LIVING (ADL): TOILETING: UNABLE TO DETERMINE AT THIS TIME

## 2018-07-06 ASSESSMENT — GAIT ASSESSMENTS
GAIT LEVEL OF ASSIST: MAXIMAL ASSIST
DISTANCE (FEET): 2
ASSISTIVE DEVICE: HAND HELD ASSIST

## 2018-07-06 ASSESSMENT — PAIN SCALES - GENERAL
PAINLEVEL_OUTOF10: 0
PAINLEVEL_OUTOF10: 0

## 2018-07-06 NOTE — CARE PLAN
Problem: Communication  Goal: The ability to communicate needs accurately and effectively will improve  POC discussed with pt. Questions answered at bedside requiring reinforcement.     Problem: Infection  Goal: Will remain free from infection  Iv abx infusing.

## 2018-07-06 NOTE — PROGRESS NOTES
2 RN skin assessment completed. No redness or open wounds noted. No pressure ulcers present. Pt has generalized scabbing through out the body.

## 2018-07-06 NOTE — CARE PLAN
Problem: Safety  Goal: Will remain free from injury    Intervention: Provide assistance with mobility  Educated to call for assistance with all OOB activities and transfers, stated understanding. Reoriented to call light. Improved mentality this afternoon was confused and anxious this am. Needs will be met per nursing staff. Bed and chair alarm in place and room close to nurses station.      Problem: Pain Management  Goal: Pain level will decrease to patient's comfort goal    Intervention: Follow pain managment plan developed in collaboration with patient and Interdisciplinary Team  Assess routinely and as needed for pain. Medicate as scheduled, needed, requested and indicated. Reports pain but denies need for Rx.

## 2018-07-06 NOTE — PROGRESS NOTES
[facility] Hospitalist Progress Note    Date of Service: 2018    Chief Complaint  89 y.o. female admitted 2018 with left wrist cellulitis    Interval Problem Update  none    Consultants/Specialty  ortho     Disposition  pending        Review of Systems   Unable to perform ROS: Dementia      Physical Exam  Laboratory/Imaging   Hemodynamics  Temp (24hrs), Av.5 °C (97.7 °F), Min:35.9 °C (96.7 °F), Max:36.9 °C (98.4 °F)   Temperature: 36.9 °C (98.4 °F)  Pulse  Av.1  Min: 62  Max: 82    Blood Pressure : 141/72      Respiratory      Respiration: 17, Pulse Oximetry: 92 %        RUL Breath Sounds: Diminished, RML Breath Sounds: Diminished, RLL Breath Sounds: Diminished, SUDHIR Breath Sounds: Diminished, LLL Breath Sounds: Diminished    Fluids    Intake/Output Summary (Last 24 hours) at 18 1049  Last data filed at 18 1130   Gross per 24 hour   Intake              240 ml   Output                0 ml   Net              240 ml       Nutrition  Orders Placed This Encounter   Procedures   • Diet Order Regular     Standing Status:   Standing     Number of Occurrences:   1     Order Specific Question:   Diet:     Answer:   Regular [1]     Order Specific Question:   Texture/Fiber modifications:     Answer:   Chopped Meat [5]     Physical Exam   Constitutional: No distress.   HENT:   Right Ear: External ear normal.   Left Ear: External ear normal.   Eyes: Right eye exhibits no discharge. Left eye exhibits no discharge.   Neck: No JVD present.   Cardiovascular: Normal heart sounds.    Pulmonary/Chest: No stridor. No respiratory distress. She has no wheezes. She has no rales.   Abdominal: She exhibits no distension. There is no tenderness. There is no rebound.   Musculoskeletal: She exhibits edema (left wrist and hand is warm to touch) and tenderness (left wrist).   Neurological: She is alert.   Skin: Skin is warm and dry. She is not diaphoretic.       Recent Labs      18   1816  18   0508  18    0453   WBC  11.7*  10.3  11.6*   RBC  4.16*  3.87*  3.86*   HEMOGLOBIN  11.8*  10.9*  11.1*   HEMATOCRIT  36.8*  35.0*  34.3*   MCV  88.5  90.4  88.9   MCH  28.4  28.2  28.8   MCHC  32.1*  31.1*  32.4*   RDW  50.7*  51.1*  49.9   PLATELETCT  265  271  264   MPV  9.3  9.3  9.4     Recent Labs      07/04/18   1816  07/05/18   0154  07/06/18   0453   SODIUM  140  140  133*   POTASSIUM  2.7*  3.6  2.5*   CHLORIDE  92*  94*  88*   CO2  33  31  34*   GLUCOSE  112*  104*  106*   BUN  56*  54*  32*   CREATININE  1.36  1.39  1.06   CALCIUM  9.4  9.1  8.9     Recent Labs      07/04/18 1816   APTT  31.3   INR  1.25*                  Assessment/Plan     * Left arm swelling   Assessment & Plan    D/w  plastic surgery and he recommended ortho consult   CT of wrist showed:1.  No acute fracture or dislocation appreciated.    2.  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    3.  No soft tissue fluid collection or mass appreciated.  On unasyn  On doxycycline  Ortho input is noted  A bit better  D/w the pt's son yesterday at the bed side          History of heart block s/p pacer- (present on admission)   Assessment & Plan    chronic        HTN (hypertension)   Assessment & Plan    nifidipine        Renal insufficiency   Assessment & Plan    2nd to overdiuresis  Hold lasix  better        Dementia   Assessment & Plan    Cognitive deficits  Monitor for delirium        Hypokalemia- (present on admission)   Assessment & Plan    kdur  Check mag          Quality-Core Measures   DVT prophylaxis pharmacological::  Heparin

## 2018-07-06 NOTE — PROGRESS NOTES
Jermain from Lab called with critical result of potassium 2.5 at 0554 am. Critical lab result read back to Jermain.   Dr. Ten Bynum notified of critical lab result at 0610.  Critical lab result read back by Dr. Ten Bynum. Awaiting orders.

## 2018-07-06 NOTE — THERAPY
"Physical Therapy Evaluation completed.   Bed Mobility:  Supine to Sit:  (up chair)  Transfers: Sit to Stand: Maximal Assist (1 person each side, L wrist painful.)  Gait: Level Of Assist: Maximal Assist with Hand held assist       Plan of Care: Will benefit from Physical Therapy 3 times per week  Discharge Recommendations: Equipment: Will Continue to Assess for Equipment Needs.  Pt presents from [facility], but need to verify with family as pt reports that she lives at home with son and DIL, very confused today. Pt is limited today by R knee pain, L wrist pain, tolerating only a few feet of ambulation before she began to sit despite cues to maintain standing. R patella is a bit red and swollen. Pt is at risk to fall.  Pt will benefit from inpt PT to address problems and assist with d/c plan. Pt will need to d/c to transitional care facility before back to [facility].     See \"Rehab Therapy-Acute\" Patient Summary Report for complete documentation.     "

## 2018-07-06 NOTE — THERAPY
"Occupational Therapy Evaluation completed.   Functional Status: Pt presents to skilled OT services following L wrist cellulitis, per ortho consult, appears to have osteoarthritic changes at the radiocarpal joint and intercarpal joints, medical management for cellulitis. Pt primarily c/o R knee pain today, noted to R patella to be red and swollen, c/o L wrist pain with attempts to range LUE, unable to tolerate ROM at digits and wrist, shoulder ~50-60 degrees, unclear baseline as pt provides contradictory information. Pt performed STS from chair with max a of 2, LB dressing and toileting max a, declined oral care. Pt does not appear to be a reliable historian, re-direction to task required throughout the session. Pt would benefit from acute skilled services while in house and likely anticipate need for post acute therapy prior to d/c home. However, pending pt's baseline PLOF and level of assist family able to provide.   Plan of Care: Will benefit from Occupational Therapy 3 times per week  Discharge Recommendations:  Equipment: Will Continue to Assess for Equipment Needs. Post-acute therapy Discharge to a transitional care facility for continued skilled therapy services.    See \"Rehab Therapy-Acute\" Patient Summary Report for complete documentation.    "

## 2018-07-07 LAB
ALBUMIN SERPL BCP-MCNC: 2.4 G/DL (ref 3.2–4.9)
ALBUMIN/GLOB SERPL: 0.6 G/DL
ALP SERPL-CCNC: 52 U/L (ref 30–99)
ALT SERPL-CCNC: <5 U/L (ref 2–50)
ANION GAP SERPL CALC-SCNC: 8 MMOL/L (ref 0–11.9)
AST SERPL-CCNC: 10 U/L (ref 12–45)
BASOPHILS # BLD AUTO: 0.5 % (ref 0–1.8)
BASOPHILS # BLD: 0.05 K/UL (ref 0–0.12)
BILIRUB SERPL-MCNC: 0.4 MG/DL (ref 0.1–1.5)
BUN SERPL-MCNC: 40 MG/DL (ref 8–22)
CALCIUM SERPL-MCNC: 8.5 MG/DL (ref 8.5–10.5)
CHLORIDE SERPL-SCNC: 95 MMOL/L (ref 96–112)
CO2 SERPL-SCNC: 33 MMOL/L (ref 20–33)
CREAT SERPL-MCNC: 1.6 MG/DL (ref 0.5–1.4)
EOSINOPHIL # BLD AUTO: 0.14 K/UL (ref 0–0.51)
EOSINOPHIL NFR BLD: 1.3 % (ref 0–6.9)
ERYTHROCYTE [DISTWIDTH] IN BLOOD BY AUTOMATED COUNT: 52 FL (ref 35.9–50)
GLOBULIN SER CALC-MCNC: 3.9 G/DL (ref 1.9–3.5)
GLUCOSE SERPL-MCNC: 106 MG/DL (ref 65–99)
HCT VFR BLD AUTO: 31.9 % (ref 37–47)
HGB BLD-MCNC: 9.9 G/DL (ref 12–16)
IMM GRANULOCYTES # BLD AUTO: 0.04 K/UL (ref 0–0.11)
IMM GRANULOCYTES NFR BLD AUTO: 0.4 % (ref 0–0.9)
LYMPHOCYTES # BLD AUTO: 1.05 K/UL (ref 1–4.8)
LYMPHOCYTES NFR BLD: 9.9 % (ref 22–41)
MAGNESIUM SERPL-MCNC: 1.6 MG/DL (ref 1.5–2.5)
MCH RBC QN AUTO: 28.1 PG (ref 27–33)
MCHC RBC AUTO-ENTMCNC: 31 G/DL (ref 33.6–35)
MCV RBC AUTO: 90.6 FL (ref 81.4–97.8)
MONOCYTES # BLD AUTO: 0.87 K/UL (ref 0–0.85)
MONOCYTES NFR BLD AUTO: 8.2 % (ref 0–13.4)
NEUTROPHILS # BLD AUTO: 8.45 K/UL (ref 2–7.15)
NEUTROPHILS NFR BLD: 79.7 % (ref 44–72)
NRBC # BLD AUTO: 0 K/UL
NRBC BLD-RTO: 0 /100 WBC
PLATELET # BLD AUTO: 243 K/UL (ref 164–446)
PMV BLD AUTO: 9.2 FL (ref 9–12.9)
POTASSIUM SERPL-SCNC: 4 MMOL/L (ref 3.6–5.5)
PROT SERPL-MCNC: 6.3 G/DL (ref 6–8.2)
RBC # BLD AUTO: 3.52 M/UL (ref 4.2–5.4)
SODIUM SERPL-SCNC: 136 MMOL/L (ref 135–145)
WBC # BLD AUTO: 10.6 K/UL (ref 4.8–10.8)

## 2018-07-07 PROCEDURE — 83735 ASSAY OF MAGNESIUM: CPT

## 2018-07-07 PROCEDURE — 36415 COLL VENOUS BLD VENIPUNCTURE: CPT

## 2018-07-07 PROCEDURE — A9270 NON-COVERED ITEM OR SERVICE: HCPCS | Performed by: FAMILY MEDICINE

## 2018-07-07 PROCEDURE — 99232 SBSQ HOSP IP/OBS MODERATE 35: CPT | Performed by: FAMILY MEDICINE

## 2018-07-07 PROCEDURE — 700105 HCHG RX REV CODE 258: Performed by: FAMILY MEDICINE

## 2018-07-07 PROCEDURE — 700111 HCHG RX REV CODE 636 W/ 250 OVERRIDE (IP): Performed by: FAMILY MEDICINE

## 2018-07-07 PROCEDURE — 80053 COMPREHEN METABOLIC PANEL: CPT

## 2018-07-07 PROCEDURE — 85025 COMPLETE CBC W/AUTO DIFF WBC: CPT

## 2018-07-07 PROCEDURE — 700102 HCHG RX REV CODE 250 W/ 637 OVERRIDE(OP): Performed by: INTERNAL MEDICINE

## 2018-07-07 PROCEDURE — 700111 HCHG RX REV CODE 636 W/ 250 OVERRIDE (IP): Performed by: INTERNAL MEDICINE

## 2018-07-07 PROCEDURE — A9270 NON-COVERED ITEM OR SERVICE: HCPCS | Performed by: INTERNAL MEDICINE

## 2018-07-07 PROCEDURE — 770006 HCHG ROOM/CARE - MED/SURG/GYN SEMI*

## 2018-07-07 PROCEDURE — 700102 HCHG RX REV CODE 250 W/ 637 OVERRIDE(OP): Performed by: FAMILY MEDICINE

## 2018-07-07 RX ORDER — SODIUM CHLORIDE 9 MG/ML
1000 INJECTION, SOLUTION INTRAVENOUS CONTINUOUS
Status: DISCONTINUED | OUTPATIENT
Start: 2018-07-07 | End: 2018-07-07

## 2018-07-07 RX ORDER — HALOPERIDOL 5 MG/ML
0.5 INJECTION INTRAMUSCULAR ONCE
Status: COMPLETED | OUTPATIENT
Start: 2018-07-07 | End: 2018-07-07

## 2018-07-07 RX ADMIN — NIFEDIPINE 30 MG: 30 TABLET, FILM COATED, EXTENDED RELEASE ORAL at 08:48

## 2018-07-07 RX ADMIN — SODIUM CHLORIDE 3 G: 900 INJECTION INTRAVENOUS at 12:19

## 2018-07-07 RX ADMIN — STANDARDIZED SENNA CONCENTRATE AND DOCUSATE SODIUM 2 TABLET: 8.6; 5 TABLET, FILM COATED ORAL at 08:48

## 2018-07-07 RX ADMIN — SODIUM CHLORIDE 3 G: 900 INJECTION INTRAVENOUS at 05:00

## 2018-07-07 RX ADMIN — LACTOBACILLUS ACIDOPHILUS / LACTOBACILLUS BULGARICUS 1 PACKET: 100 MILLION CFU STRENGTH GRANULES at 12:18

## 2018-07-07 RX ADMIN — LACTOBACILLUS ACIDOPHILUS / LACTOBACILLUS BULGARICUS 1 PACKET: 100 MILLION CFU STRENGTH GRANULES at 08:48

## 2018-07-07 RX ADMIN — DORZOLAMIDE HYDROCHLORIDE AND TIMOLOL MALEATE 1 DROP: 20; 5 SOLUTION/ DROPS OPHTHALMIC at 17:25

## 2018-07-07 RX ADMIN — DOXYCYCLINE 100 MG: 100 TABLET ORAL at 08:48

## 2018-07-07 RX ADMIN — SODIUM CHLORIDE 3 G: 900 INJECTION INTRAVENOUS at 17:25

## 2018-07-07 RX ADMIN — HEPARIN SODIUM 5000 UNITS: 5000 INJECTION, SOLUTION INTRAVENOUS; SUBCUTANEOUS at 08:48

## 2018-07-07 RX ADMIN — LATANOPROST 1 DROP: 50 SOLUTION OPHTHALMIC at 17:24

## 2018-07-07 RX ADMIN — DORZOLAMIDE HYDROCHLORIDE AND TIMOLOL MALEATE 1 DROP: 20; 5 SOLUTION/ DROPS OPHTHALMIC at 08:47

## 2018-07-07 RX ADMIN — SODIUM CHLORIDE 3 G: 900 INJECTION INTRAVENOUS at 23:28

## 2018-07-07 RX ADMIN — HALOPERIDOL LACTATE 0.5 MG: 5 INJECTION, SOLUTION INTRAMUSCULAR at 23:28

## 2018-07-07 RX ADMIN — SODIUM CHLORIDE 3 G: 900 INJECTION INTRAVENOUS at 00:38

## 2018-07-07 ASSESSMENT — PAIN SCALES - GENERAL
PAINLEVEL_OUTOF10: 0

## 2018-07-07 NOTE — PROGRESS NOTES
[facility] Hospitalist Progress Note    Date of Service: 2018    Chief Complaint  89 y.o. female admitted 2018 with left wrist cellulitis    Interval Problem Update  none    Consultants/Specialty  ortho     Disposition  pending        Review of Systems   Unable to perform ROS: Dementia      Physical Exam  Laboratory/Imaging   Hemodynamics  Temp (24hrs), Av.2 °C (97.2 °F), Min:36 °C (96.8 °F), Max:36.4 °C (97.5 °F)   Temperature: 36.2 °C (97.2 °F)  Pulse  Av.8  Min: 62  Max: 83    Blood Pressure : 134/79      Respiratory      Respiration: 16, Pulse Oximetry: 98 %        RUL Breath Sounds: Clear, RML Breath Sounds: Diminished, RLL Breath Sounds: Diminished, SUDHIR Breath Sounds: Clear, LLL Breath Sounds: Diminished    Fluids    Intake/Output Summary (Last 24 hours) at 18 1101  Last data filed at 18 0935   Gross per 24 hour   Intake              485 ml   Output                0 ml   Net              485 ml       Nutrition  Orders Placed This Encounter   Procedures   • Diet Order Regular     Standing Status:   Standing     Number of Occurrences:   1     Order Specific Question:   Diet:     Answer:   Regular [1]     Order Specific Question:   Texture/Fiber modifications:     Answer:   Chopped Meat [5]     Physical Exam   Constitutional: No distress.   HENT:   Head: Normocephalic and atraumatic.   Eyes: Right eye exhibits no discharge. Left eye exhibits no discharge.   Neck: Normal range of motion. Neck supple.   Cardiovascular: Normal rate and regular rhythm.    Pulmonary/Chest: Effort normal and breath sounds normal.   Abdominal: Soft. Bowel sounds are normal.   Musculoskeletal: She exhibits edema (left wrist and hand is warm to touch) and tenderness (left wrist).   Neurological: She is alert.   Skin: Skin is warm and dry. She is not diaphoretic.       Recent Labs      18   0508  18   0453  18   0306   WBC  10.3  11.6*  10.6   RBC  3.87*  3.86*  3.52*   HEMOGLOBIN  10.9*  11.1*   9.9*   HEMATOCRIT  35.0*  34.3*  31.9*   MCV  90.4  88.9  90.6   MCH  28.2  28.8  28.1   MCHC  31.1*  32.4*  31.0*   RDW  51.1*  49.9  52.0*   PLATELETCT  271  264  243   MPV  9.3  9.4  9.2     Recent Labs      07/05/18   0154  07/06/18   0453  07/07/18   0306   SODIUM  140  133*  136   POTASSIUM  3.6  2.5*  4.0   CHLORIDE  94*  88*  95*   CO2  31  34*  33   GLUCOSE  104*  106*  106*   BUN  54*  32*  40*   CREATININE  1.39  1.06  1.60*   CALCIUM  9.1  8.9  8.5     Recent Labs      07/04/18   1816   APTT  31.3   INR  1.25*                  Assessment/Plan     * Left arm swelling   Assessment & Plan    D/w  plastic surgery and he recommended ortho consult   CT of wrist showed:1.  No acute fracture or dislocation appreciated.    2.  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    3.  No soft tissue fluid collection or mass appreciated.  On unasyn  On doxycycline  Ortho input is noted  It is  Better with pain with palpation of the area            History of heart block s/p pacer- (present on admission)   Assessment & Plan    chronic        HTN (hypertension)   Assessment & Plan    nifidipine        Renal insufficiency   Assessment & Plan    2nd to overdiuresis  Hold lasix  Will check bmp in am        Dementia   Assessment & Plan    Cognitive deficits  Monitor for delirium        Hypokalemia- (present on admission)   Assessment & Plan    kdur  Check mag          Quality-Core Measures   DVT prophylaxis pharmacological::  Heparin

## 2018-07-07 NOTE — CARE PLAN
Problem: Communication  Goal: The ability to communicate needs accurately and effectively will improve    Intervention: [place] patient and significant other/support system to call light to alert staff of needs  Educated to room, bed and call light use, educated to call for all OOB activities and transfers, stated understanding. Mental status improved today and retains some education and teaching. Will round on routinely and as needed.      Problem: Safety  Goal: Will remain free from injury    Intervention: Provide assistance with mobility  Requires assistance with all transfers, is hesitant at times. Will ensure safety and remain free of falls.

## 2018-07-07 NOTE — CARE PLAN
Problem: Communication  Goal: The ability to communicate needs accurately and effectively will improve    Intervention: Reorient patient to environment as needed  Pt with disorientation to time and intermittently location. Pt able to make conversation, and make needs know. Educated on call light usage for needs, however pt needs frequent reinforcement and rounding.  Pt frequently reoriented, and fall precautions in place. Hourly rounding for safety.       Problem: Safety  Goal: Will remain free from falls  Outcome: PROGRESSING AS EXPECTED  Pt educated on fall precautions, indicated understanding. Bed locked, lowest position, side rails 3/4 up, non skid footwear on, bed alarm on and audible,  and call light in reach. Hourly rounding performed, will continue to monitor and reassess.

## 2018-07-07 NOTE — PROGRESS NOTES
Pt sitting up in chair bedside. Pt ate most of dinner tray. Pt assisted x2 person to transfer back to bed, and void with bedpan. Pt alert, oriented to person and intermittently place. Disoriented to time. Fall precautions in place. Hourly rounding completed and call light in reach. Will continue to monitor and reassess.

## 2018-07-07 NOTE — PROGRESS NOTES
"Seen and examined.  Resting.  She states her wrist doesn't hurt, but doesn't want it to be moved, because she states it hurts.    Blood pressure 134/79, pulse 71, temperature 36.2 °C (97.2 °F), resp. rate 16, height 1.52 m (4' 11.84\"), weight 50.4 kg (111 lb 1.8 oz), SpO2 98 %, not currently breastfeeding.      Exam:  Improved tenderness on dorsum of hand.  Does not allow ROM or further examination. Minimal redness.    #1 Left wrist cellulitis    Plan:  - Continue ABX, symptomatic management  - No surgery anticipated at this time  "

## 2018-07-08 LAB
ALBUMIN SERPL BCP-MCNC: 2.5 G/DL (ref 3.2–4.9)
ALBUMIN/GLOB SERPL: 0.6 G/DL
ALP SERPL-CCNC: 55 U/L (ref 30–99)
ALT SERPL-CCNC: <5 U/L (ref 2–50)
ANION GAP SERPL CALC-SCNC: 11 MMOL/L (ref 0–11.9)
AST SERPL-CCNC: 11 U/L (ref 12–45)
BASOPHILS # BLD AUTO: 0.3 % (ref 0–1.8)
BASOPHILS # BLD: 0.03 K/UL (ref 0–0.12)
BILIRUB SERPL-MCNC: 0.5 MG/DL (ref 0.1–1.5)
BUN SERPL-MCNC: 43 MG/DL (ref 8–22)
CALCIUM SERPL-MCNC: 8.8 MG/DL (ref 8.5–10.5)
CHLORIDE SERPL-SCNC: 95 MMOL/L (ref 96–112)
CO2 SERPL-SCNC: 32 MMOL/L (ref 20–33)
CREAT SERPL-MCNC: 1.65 MG/DL (ref 0.5–1.4)
EOSINOPHIL # BLD AUTO: 0.17 K/UL (ref 0–0.51)
EOSINOPHIL NFR BLD: 1.5 % (ref 0–6.9)
ERYTHROCYTE [DISTWIDTH] IN BLOOD BY AUTOMATED COUNT: 51.8 FL (ref 35.9–50)
GLOBULIN SER CALC-MCNC: 4.3 G/DL (ref 1.9–3.5)
GLUCOSE SERPL-MCNC: 107 MG/DL (ref 65–99)
HCT VFR BLD AUTO: 32.1 % (ref 37–47)
HGB BLD-MCNC: 10.2 G/DL (ref 12–16)
IMM GRANULOCYTES # BLD AUTO: 0.08 K/UL (ref 0–0.11)
IMM GRANULOCYTES NFR BLD AUTO: 0.7 % (ref 0–0.9)
LYMPHOCYTES # BLD AUTO: 0.87 K/UL (ref 1–4.8)
LYMPHOCYTES NFR BLD: 7.9 % (ref 22–41)
MCH RBC QN AUTO: 28.6 PG (ref 27–33)
MCHC RBC AUTO-ENTMCNC: 31.8 G/DL (ref 33.6–35)
MCV RBC AUTO: 89.9 FL (ref 81.4–97.8)
MONOCYTES # BLD AUTO: 0.88 K/UL (ref 0–0.85)
MONOCYTES NFR BLD AUTO: 8 % (ref 0–13.4)
NEUTROPHILS # BLD AUTO: 9 K/UL (ref 2–7.15)
NEUTROPHILS NFR BLD: 81.6 % (ref 44–72)
NRBC # BLD AUTO: 0 K/UL
NRBC BLD-RTO: 0 /100 WBC
PLATELET # BLD AUTO: 234 K/UL (ref 164–446)
PMV BLD AUTO: 9.2 FL (ref 9–12.9)
POTASSIUM SERPL-SCNC: 3.4 MMOL/L (ref 3.6–5.5)
PROT SERPL-MCNC: 6.8 G/DL (ref 6–8.2)
RBC # BLD AUTO: 3.57 M/UL (ref 4.2–5.4)
SODIUM SERPL-SCNC: 138 MMOL/L (ref 135–145)
WBC # BLD AUTO: 11 K/UL (ref 4.8–10.8)

## 2018-07-08 PROCEDURE — 700102 HCHG RX REV CODE 250 W/ 637 OVERRIDE(OP): Mod: TB | Performed by: INTERNAL MEDICINE

## 2018-07-08 PROCEDURE — 80053 COMPREHEN METABOLIC PANEL: CPT

## 2018-07-08 PROCEDURE — A9270 NON-COVERED ITEM OR SERVICE: HCPCS | Mod: TB | Performed by: INTERNAL MEDICINE

## 2018-07-08 PROCEDURE — 36415 COLL VENOUS BLD VENIPUNCTURE: CPT

## 2018-07-08 PROCEDURE — 700102 HCHG RX REV CODE 250 W/ 637 OVERRIDE(OP): Performed by: FAMILY MEDICINE

## 2018-07-08 PROCEDURE — 85025 COMPLETE CBC W/AUTO DIFF WBC: CPT

## 2018-07-08 PROCEDURE — 770006 HCHG ROOM/CARE - MED/SURG/GYN SEMI*

## 2018-07-08 PROCEDURE — 99232 SBSQ HOSP IP/OBS MODERATE 35: CPT | Performed by: FAMILY MEDICINE

## 2018-07-08 PROCEDURE — A9270 NON-COVERED ITEM OR SERVICE: HCPCS | Performed by: FAMILY MEDICINE

## 2018-07-08 PROCEDURE — 700111 HCHG RX REV CODE 636 W/ 250 OVERRIDE (IP): Performed by: FAMILY MEDICINE

## 2018-07-08 PROCEDURE — 700105 HCHG RX REV CODE 258: Performed by: FAMILY MEDICINE

## 2018-07-08 RX ORDER — SODIUM CHLORIDE 9 MG/ML
1000 INJECTION, SOLUTION INTRAVENOUS CONTINUOUS
Status: DISCONTINUED | OUTPATIENT
Start: 2018-07-08 | End: 2018-07-08

## 2018-07-08 RX ADMIN — LACTOBACILLUS ACIDOPHILUS / LACTOBACILLUS BULGARICUS 1 PACKET: 100 MILLION CFU STRENGTH GRANULES at 09:37

## 2018-07-08 RX ADMIN — SODIUM CHLORIDE 3 G: 900 INJECTION INTRAVENOUS at 05:48

## 2018-07-08 RX ADMIN — HEPARIN SODIUM 5000 UNITS: 5000 INJECTION, SOLUTION INTRAVENOUS; SUBCUTANEOUS at 20:07

## 2018-07-08 RX ADMIN — DORZOLAMIDE HYDROCHLORIDE AND TIMOLOL MALEATE 1 DROP: 20; 5 SOLUTION/ DROPS OPHTHALMIC at 20:08

## 2018-07-08 RX ADMIN — SODIUM CHLORIDE 1000 ML: 9 INJECTION, SOLUTION INTRAVENOUS at 17:54

## 2018-07-08 RX ADMIN — SODIUM CHLORIDE 3 G: 900 INJECTION INTRAVENOUS at 11:24

## 2018-07-08 RX ADMIN — DORZOLAMIDE HYDROCHLORIDE AND TIMOLOL MALEATE 1 DROP: 20; 5 SOLUTION/ DROPS OPHTHALMIC at 05:49

## 2018-07-08 RX ADMIN — STANDARDIZED SENNA CONCENTRATE AND DOCUSATE SODIUM 2 TABLET: 8.6; 5 TABLET, FILM COATED ORAL at 20:07

## 2018-07-08 RX ADMIN — NIFEDIPINE 30 MG: 30 TABLET, FILM COATED, EXTENDED RELEASE ORAL at 05:48

## 2018-07-08 RX ADMIN — SODIUM CHLORIDE 3 G: 900 INJECTION INTRAVENOUS at 17:40

## 2018-07-08 RX ADMIN — SOTALOL HYDROCHLORIDE 80 MG: 80 TABLET ORAL at 20:08

## 2018-07-08 RX ADMIN — HEPARIN SODIUM 5000 UNITS: 5000 INJECTION, SOLUTION INTRAVENOUS; SUBCUTANEOUS at 09:38

## 2018-07-08 RX ADMIN — LATANOPROST 1 DROP: 50 SOLUTION OPHTHALMIC at 20:08

## 2018-07-08 RX ADMIN — LACTOBACILLUS ACIDOPHILUS / LACTOBACILLUS BULGARICUS 1 PACKET: 100 MILLION CFU STRENGTH GRANULES at 11:24

## 2018-07-08 RX ADMIN — DOXYCYCLINE 100 MG: 100 TABLET ORAL at 00:13

## 2018-07-08 RX ADMIN — SODIUM CHLORIDE 1000 ML: 9 INJECTION, SOLUTION INTRAVENOUS at 11:25

## 2018-07-08 RX ADMIN — DOXYCYCLINE 100 MG: 100 TABLET ORAL at 05:48

## 2018-07-08 ASSESSMENT — PAIN SCALES - GENERAL
PAINLEVEL_OUTOF10: 0
PAINLEVEL_OUTOF10: 6

## 2018-07-08 NOTE — PROGRESS NOTES
Pt very alert this morning, calm cooperative and pleasant. Pt drank milk and took am medications. Voided in bedpan. Keeping oxygen in place.

## 2018-07-08 NOTE — CARE PLAN
"Problem: Safety  Goal: Will remain free from falls  Outcome: PROGRESSING AS EXPECTED  Pt educated on fall precautions, indicated understanding. Bed locked, lowest position, side rails 3/4 up, non skid footwear on, bed alarm on and audible, and call light in reach. Hourly rounding performed, will continue to monitor and reassess.       Problem: Psychosocial Needs:  Goal: Level of anxiety will decrease  Pt with confusion and agitation upon stimulation from staff. Pt refuses all care and states \"just let me sleep\". Attempted to reorient pt, pt verbal with RN and continues to refuse.       "

## 2018-07-08 NOTE — PROGRESS NOTES
"Received pt, resting quietly in bed. Nasal cannula lying bedside pt. Spoke to pt, and let her know that night RN has returned pt states \"you can go home\". Asked pt if we may replace her oxygen to help her breath better, and pt refused. Pt refuses offers of assistance at this time.   "

## 2018-07-08 NOTE — PROGRESS NOTES
Pt continues to remove oxygen mask. Switched back to nasal cannula. Pt drowsy, pulse ox on. Will continue to monitor and reassess    0202: pt desatted to 84% on 4L NC. Increased to 5L O2 sat remains 87%. Pt with weak wet cough. Respiratory therapy paged to come see pt. sats 88% on continuous pulse ox    0215: respriatory bedside. Pt with productive cough, sats increased to 92% on 7L oxymask. Pt compliant with oxygen at this time, will continue to monitor and reassess.

## 2018-07-08 NOTE — PROGRESS NOTES
[facility] Hospitalist Progress Note    Date of Service: 2018    Chief Complaint  89 y.o. female admitted 2018 with left wrist cellulitis    Interval Problem Update  none    Consultants/Specialty  ortho     Disposition  pending        Review of Systems   Unable to perform ROS: Dementia      Physical Exam  Laboratory/Imaging   Hemodynamics  Temp (24hrs), Av.3 °C (97.4 °F), Min:36.2 °C (97.1 °F), Max:36.4 °C (97.6 °F)   Temperature: 36.2 °C (97.1 °F)  Pulse  Av.6  Min: 62  Max: 91    Blood Pressure : 117/57      Respiratory      Respiration: 18, Pulse Oximetry: 98 %        RUL Breath Sounds: Diminished, RML Breath Sounds: Diminished, RLL Breath Sounds: Diminished, SUDHIR Breath Sounds: Diminished, LLL Breath Sounds: Diminished    Fluids    Intake/Output Summary (Last 24 hours) at 18 1115  Last data filed at 18 0915   Gross per 24 hour   Intake              436 ml   Output                2 ml   Net              434 ml       Nutrition  Orders Placed This Encounter   Procedures   • Diet Order Regular     Standing Status:   Standing     Number of Occurrences:   1     Order Specific Question:   Diet:     Answer:   Regular [1]     Order Specific Question:   Texture/Fiber modifications:     Answer:   Chopped Meat [5]     Physical Exam   Constitutional: No distress.   HENT:   Right Ear: External ear normal.   Left Ear: External ear normal.   Eyes: Right eye exhibits no discharge. Left eye exhibits no discharge.   Neck: No JVD present.   Cardiovascular: Normal heart sounds.    Pulmonary/Chest: No stridor. No respiratory distress. She has no wheezes. She has no rales.   Abdominal: She exhibits no distension. There is no tenderness. There is no rebound.   Musculoskeletal: She exhibits edema (left wrist and hand is warm to touch) and tenderness (left wrist).   Neurological: She is alert.   Skin: Skin is warm and dry. She is not diaphoretic.       Recent Labs      18   0453  18   0306  18    0320   WBC  11.6*  10.6  11.0*   RBC  3.86*  3.52*  3.57*   HEMOGLOBIN  11.1*  9.9*  10.2*   HEMATOCRIT  34.3*  31.9*  32.1*   MCV  88.9  90.6  89.9   MCH  28.8  28.1  28.6   MCHC  32.4*  31.0*  31.8*   RDW  49.9  52.0*  51.8*   PLATELETCT  264  243  234   MPV  9.4  9.2  9.2     Recent Labs      07/06/18   0453  07/07/18   0306  07/08/18   0320   SODIUM  133*  136  138   POTASSIUM  2.5*  4.0  3.4*   CHLORIDE  88*  95*  95*   CO2  34*  33  32   GLUCOSE  106*  106*  107*   BUN  32*  40*  43*   CREATININE  1.06  1.60*  1.65*   CALCIUM  8.9  8.5  8.8                      Assessment/Plan     * Left arm swelling   Assessment & Plan     CT of wrist showed:1.  No acute fracture or dislocation appreciated.    2.  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    3.  No soft tissue fluid collection or mass appreciated.  On unasyn  On doxycycline  Ortho input is noted  It is  Better with pain with palpation of the area            History of heart block s/p pacer- (present on admission)   Assessment & Plan    chronic        HTN (hypertension)   Assessment & Plan    nifidipine        Renal insufficiency   Assessment & Plan    2nd to overdiuresis  Hold lasix  Will check bmp in am  Iv fluid n.s        Dementia   Assessment & Plan    Cognitive deficits  Monitor for delirium        Hypokalemia- (present on admission)   Assessment & Plan    kdur  Check mag          Quality-Core Measures   DVT prophylaxis pharmacological::  Heparin

## 2018-07-08 NOTE — PROGRESS NOTES
"Pt removing oxygen and pulse ox. Refusing to keep on, pt restating \"just let die, leave me alone, cut my head off\". Offered to pt to call her son, pt states she doesn't want to talk to anybody on the phone. Hospitalist on call paged, awaiting return call.    2325: MD Cai returned call. Ordered 0.5mg IV haldol x1 dose and if not successful x2 wrist restraints. Haldol administered and pulse ox reapplied, oxygen mask applied to pt again. Will continue to monitor and reassess.    "

## 2018-07-08 NOTE — CARE PLAN
Problem: Safety  Goal: Will remain free from injury    Intervention: Provide assistance with mobility  Requires assistance with all transfers and oob activities, will round on and monitor routinely per nursing staff. Bed/chair alarms utilized for safety.      Problem: Respiratory:  Goal: Respiratory status will improve    Intervention: Assess and monitor pulmonary status  Had oxymask on at start of shift however pt was noncompliant with keeping in place and desatting, nasal canula applied and sats back up to 90s when pt calmer and resting, when pt upset sats decrease. Has been satting in 90s this shift.

## 2018-07-09 LAB
ALBUMIN SERPL BCP-MCNC: 2.6 G/DL (ref 3.2–4.9)
ALBUMIN/GLOB SERPL: 0.7 G/DL
ALP SERPL-CCNC: 65 U/L (ref 30–99)
ALT SERPL-CCNC: <5 U/L (ref 2–50)
ANION GAP SERPL CALC-SCNC: 13 MMOL/L (ref 0–11.9)
AST SERPL-CCNC: 10 U/L (ref 12–45)
BASOPHILS # BLD AUTO: 0.3 % (ref 0–1.8)
BASOPHILS # BLD: 0.04 K/UL (ref 0–0.12)
BILIRUB SERPL-MCNC: 0.5 MG/DL (ref 0.1–1.5)
BUN SERPL-MCNC: 45 MG/DL (ref 8–22)
CALCIUM SERPL-MCNC: 8.2 MG/DL (ref 8.5–10.5)
CHLORIDE SERPL-SCNC: 95 MMOL/L (ref 96–112)
CO2 SERPL-SCNC: 30 MMOL/L (ref 20–33)
CREAT SERPL-MCNC: 2.14 MG/DL (ref 0.5–1.4)
EOSINOPHIL # BLD AUTO: 0.17 K/UL (ref 0–0.51)
EOSINOPHIL NFR BLD: 1.2 % (ref 0–6.9)
ERYTHROCYTE [DISTWIDTH] IN BLOOD BY AUTOMATED COUNT: 52.2 FL (ref 35.9–50)
GLOBULIN SER CALC-MCNC: 3.6 G/DL (ref 1.9–3.5)
GLUCOSE SERPL-MCNC: 101 MG/DL (ref 65–99)
HCT VFR BLD AUTO: 28.6 % (ref 37–47)
HGB BLD-MCNC: 9.3 G/DL (ref 12–16)
IMM GRANULOCYTES # BLD AUTO: 0.15 K/UL (ref 0–0.11)
IMM GRANULOCYTES NFR BLD AUTO: 1.1 % (ref 0–0.9)
LYMPHOCYTES # BLD AUTO: 1.14 K/UL (ref 1–4.8)
LYMPHOCYTES NFR BLD: 8.4 % (ref 22–41)
MCH RBC QN AUTO: 29.3 PG (ref 27–33)
MCHC RBC AUTO-ENTMCNC: 32.5 G/DL (ref 33.6–35)
MCV RBC AUTO: 90.2 FL (ref 81.4–97.8)
MONOCYTES # BLD AUTO: 1.06 K/UL (ref 0–0.85)
MONOCYTES NFR BLD AUTO: 7.8 % (ref 0–13.4)
NEUTROPHILS # BLD AUTO: 11.09 K/UL (ref 2–7.15)
NEUTROPHILS NFR BLD: 81.2 % (ref 44–72)
NRBC # BLD AUTO: 0 K/UL
NRBC BLD-RTO: 0 /100 WBC
PLATELET # BLD AUTO: 235 K/UL (ref 164–446)
PMV BLD AUTO: 9.8 FL (ref 9–12.9)
POTASSIUM SERPL-SCNC: 3.5 MMOL/L (ref 3.6–5.5)
PROT SERPL-MCNC: 6.2 G/DL (ref 6–8.2)
RBC # BLD AUTO: 3.17 M/UL (ref 4.2–5.4)
SODIUM SERPL-SCNC: 138 MMOL/L (ref 135–145)
WBC # BLD AUTO: 13.7 K/UL (ref 4.8–10.8)

## 2018-07-09 PROCEDURE — G8988 SELF CARE GOAL STATUS: HCPCS | Mod: CJ

## 2018-07-09 PROCEDURE — G8989 SELF CARE D/C STATUS: HCPCS | Mod: CL

## 2018-07-09 PROCEDURE — A9270 NON-COVERED ITEM OR SERVICE: HCPCS | Performed by: FAMILY MEDICINE

## 2018-07-09 PROCEDURE — 80053 COMPREHEN METABOLIC PANEL: CPT

## 2018-07-09 PROCEDURE — 700111 HCHG RX REV CODE 636 W/ 250 OVERRIDE (IP): Performed by: FAMILY MEDICINE

## 2018-07-09 PROCEDURE — 700102 HCHG RX REV CODE 250 W/ 637 OVERRIDE(OP): Performed by: FAMILY MEDICINE

## 2018-07-09 PROCEDURE — 85025 COMPLETE CBC W/AUTO DIFF WBC: CPT

## 2018-07-09 PROCEDURE — 770006 HCHG ROOM/CARE - MED/SURG/GYN SEMI*

## 2018-07-09 PROCEDURE — 36415 COLL VENOUS BLD VENIPUNCTURE: CPT

## 2018-07-09 PROCEDURE — 700102 HCHG RX REV CODE 250 W/ 637 OVERRIDE(OP): Performed by: INTERNAL MEDICINE

## 2018-07-09 PROCEDURE — 700105 HCHG RX REV CODE 258: Performed by: FAMILY MEDICINE

## 2018-07-09 PROCEDURE — A9270 NON-COVERED ITEM OR SERVICE: HCPCS | Performed by: INTERNAL MEDICINE

## 2018-07-09 RX ORDER — ATROPINE SULFATE 10 MG/ML
2 SOLUTION/ DROPS OPHTHALMIC EVERY 4 HOURS PRN
Status: DISCONTINUED | OUTPATIENT
Start: 2018-07-09 | End: 2018-07-13 | Stop reason: HOSPADM

## 2018-07-09 RX ORDER — MORPHINE SULFATE 10 MG/ML
10 INJECTION, SOLUTION INTRAMUSCULAR; INTRAVENOUS
Status: DISCONTINUED | OUTPATIENT
Start: 2018-07-09 | End: 2018-07-13 | Stop reason: HOSPADM

## 2018-07-09 RX ORDER — LORAZEPAM 2 MG/ML
1 CONCENTRATE ORAL
Status: DISCONTINUED | OUTPATIENT
Start: 2018-07-09 | End: 2018-07-13 | Stop reason: HOSPADM

## 2018-07-09 RX ORDER — MORPHINE SULFATE 100 MG/5ML
10 SOLUTION ORAL
Status: DISCONTINUED | OUTPATIENT
Start: 2018-07-09 | End: 2018-07-13 | Stop reason: HOSPADM

## 2018-07-09 RX ORDER — SODIUM CHLORIDE 9 MG/ML
INJECTION, SOLUTION INTRAVENOUS CONTINUOUS
Status: DISCONTINUED | OUTPATIENT
Start: 2018-07-09 | End: 2018-07-09

## 2018-07-09 RX ORDER — MORPHINE SULFATE 10 MG/ML
5 INJECTION, SOLUTION INTRAMUSCULAR; INTRAVENOUS
Status: DISCONTINUED | OUTPATIENT
Start: 2018-07-09 | End: 2018-07-13 | Stop reason: HOSPADM

## 2018-07-09 RX ORDER — LORAZEPAM 2 MG/ML
1 INJECTION INTRAMUSCULAR
Status: DISCONTINUED | OUTPATIENT
Start: 2018-07-09 | End: 2018-07-13 | Stop reason: HOSPADM

## 2018-07-09 RX ADMIN — SODIUM CHLORIDE 3 G: 900 INJECTION INTRAVENOUS at 08:49

## 2018-07-09 RX ADMIN — HEPARIN SODIUM 5000 UNITS: 5000 INJECTION, SOLUTION INTRAVENOUS; SUBCUTANEOUS at 08:35

## 2018-07-09 RX ADMIN — DORZOLAMIDE HYDROCHLORIDE AND TIMOLOL MALEATE 1 DROP: 20; 5 SOLUTION/ DROPS OPHTHALMIC at 05:38

## 2018-07-09 RX ADMIN — SODIUM CHLORIDE 3 G: 900 INJECTION INTRAVENOUS at 01:21

## 2018-07-09 RX ADMIN — NIFEDIPINE 30 MG: 30 TABLET, FILM COATED, EXTENDED RELEASE ORAL at 05:38

## 2018-07-09 RX ADMIN — LACTOBACILLUS ACIDOPHILUS / LACTOBACILLUS BULGARICUS 1 PACKET: 100 MILLION CFU STRENGTH GRANULES at 08:35

## 2018-07-09 RX ADMIN — LACTOBACILLUS ACIDOPHILUS / LACTOBACILLUS BULGARICUS 1 PACKET: 100 MILLION CFU STRENGTH GRANULES at 13:16

## 2018-07-09 RX ADMIN — DOXYCYCLINE 100 MG: 100 TABLET ORAL at 05:37

## 2018-07-09 ASSESSMENT — PAIN SCALES - GENERAL
PAINLEVEL_OUTOF10: 2
PAINLEVEL_OUTOF10: 0

## 2018-07-09 NOTE — CARE PLAN
Problem: Safety  Goal: Will remain free from injury  Outcome: PROGRESSING AS EXPECTED  Call light within reach. Hourly rounding completed. Pt safe and free from falls.     Problem: Infection  Goal: Will remain free from infection  Outcome: PROGRESSING AS EXPECTED  Hand and personal hygiene promoted. All precautions maintained going in and out of room

## 2018-07-09 NOTE — DISCHARGE PLANNING
Received Choice form at 1046.  Agency/Facility Name: (1) [facility] (2) [facility] (3) [facility]  Referral sent per Choice form at 1052.  Choice obtained by Theresa VILLAR CM.

## 2018-07-09 NOTE — PROGRESS NOTES
Assumed care of pt at 1930. Report received and bedside rounding completed with day RN. Pt in bed resting sleeping. No SOB, or in any acute distress. Pt sleeping a this time on NC oxygen.  Fall precautions in place,  bed alarm. - Treaded non slip socks. Call light and pt belongings within reach - hourly rounding in place. See flowsheets for further assessment.

## 2018-07-09 NOTE — PROGRESS NOTES
Pt is alert and oriented to self only. Son came in for visit. MD changed order and now patient is DNAR DNI- COMFORT CARE. Pt satting well on O2. Voiding adequate amounts. Needs help eating and often coughs when eating. Suction set up at bedside. Diet order was changed to soft due to hard time chewing and swallowing. Pt is saline locked. Pt still asking to use bedpan when she needs to void. Pt was up in chair for small amount of time today. Sleeping between care. CM working on hospice acceptance.

## 2018-07-09 NOTE — PROGRESS NOTES
Pt received PICC. X-ray was taken. According to note PICC was placed in correct spot. RN double checked with [place] charge nurse. New tubing was placed with fluids.

## 2018-07-09 NOTE — PROGRESS NOTES
[facility] Hospitalist Progress Note    Date of Service: 2018    Chief Complaint  89 y.o. female admitted 2018 with left wrist cellulitis    Interval Problem Update  none    Consultants/Specialty  ortho     Disposition  pending        Review of Systems   Unable to perform ROS: Dementia      Physical Exam  Laboratory/Imaging   Hemodynamics  Temp (24hrs), Av.9 °C (98.4 °F), Min:36.5 °C (97.7 °F), Max:37.4 °C (99.4 °F)   Temperature: 37.4 °C (99.4 °F)  Pulse  Av  Min: 62  Max: 91    Blood Pressure : 119/69      Respiratory      Respiration: 18, Pulse Oximetry: 92 %        RUL Breath Sounds: Crackles, RML Breath Sounds: Crackles, RLL Breath Sounds: Crackles, SUDHIR Breath Sounds: Crackles, LLL Breath Sounds: Crackles    Fluids    Intake/Output Summary (Last 24 hours) at 18 1012  Last data filed at 18 0905   Gross per 24 hour   Intake              118 ml   Output                0 ml   Net              118 ml       Nutrition  Orders Placed This Encounter   Procedures   • Diet Order Regular     Standing Status:   Standing     Number of Occurrences:   1     Order Specific Question:   Diet:     Answer:   Regular [1]     Order Specific Question:   Texture/Fiber modifications:     Answer:   Chopped Meat [5]     Physical Exam   Constitutional: No distress.   HENT:   Head: Normocephalic and atraumatic.   Eyes: Right eye exhibits no discharge. Left eye exhibits no discharge.   Neck: Normal range of motion. Neck supple.   Cardiovascular: Normal rate and regular rhythm.    Pulmonary/Chest: Effort normal and breath sounds normal.   Abdominal: Soft. Bowel sounds are normal.   Musculoskeletal: She exhibits edema (left wrist and hand is warm to touch) and tenderness (left wrist).   Neurological: She is alert.   Skin: Skin is warm and dry. She is not diaphoretic.       Recent Labs      18   0306  18   0320  18   0204   WBC  10.6  11.0*  13.7*   RBC  3.52*  3.57*  3.17*   HEMOGLOBIN  9.9*  10.2*   9.3*   HEMATOCRIT  31.9*  32.1*  28.6*   MCV  90.6  89.9  90.2   MCH  28.1  28.6  29.3   MCHC  31.0*  31.8*  32.5*   RDW  52.0*  51.8*  52.2*   PLATELETCT  243  234  235   MPV  9.2  9.2  9.8     Recent Labs      07/07/18   0306  07/08/18   0320  07/09/18   0204   SODIUM  136  138  138   POTASSIUM  4.0  3.4*  3.5*   CHLORIDE  95*  95*  95*   CO2  33  32  30   GLUCOSE  106*  107*  101*   BUN  40*  43*  45*   CREATININE  1.60*  1.65*  2.14*   CALCIUM  8.5  8.8  8.2*                      Assessment/Plan     * Left arm swelling   Assessment & Plan     CT of wrist showed:1.  No acute fracture or dislocation appreciated.    2.  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    3.  No soft tissue fluid collection or mass appreciated.  Completed 5 days of antibiotics  Ortho input is noted  It is  Better with pain with palpation of the area            History of heart block s/p pacer- (present on admission)   Assessment & Plan    chronic        HTN (hypertension)   Assessment & Plan    nifidipine        Renal insufficiency   Assessment & Plan    2nd to overdiuresis  D/w the son and he stated that hw would like for her mother to  Be comfort care and he agreed with hospice  Will consult hospice  Comfort measuers order set is activated        Dementia   Assessment & Plan    Cognitive deficits  Monitor for delirium        Hypokalemia- (present on admission)   Assessment & Plan    resolved          Quality-Core Measures   DVT prophylaxis pharmacological::  Heparin

## 2018-07-09 NOTE — DISCHARGE PLANNING
Anticipated Discharge Disposition: SNF.    Action: RN CM met with pt and son, Jesus Reis, at bedside. Son inquiring about snf placement and process. CHOICE form explained and choices made by Jesus Reis. Son wants pt to get stronger to eventually return to her home with the son and daughter-in-law. CHOICE faxed to [facility] at ext. [phone].    Barriers to Discharge: Medical clearance, accepting SNF.    Plan: RN CM to follow and notify team when accepting snf.

## 2018-07-09 NOTE — DISCHARGE PLANNING
Anticipated Discharge Disposition: SNF    Action: LSW called and spoke w/ pt's DIL, Sonia, who states that pt's son, Bartolo, is on his way to visit the pt today. Sonia reports that they would like the pt to be able to go to [facility] in [place], [place]. LSW will meet w/ Bartolo upon his arrival today to discuss SNF Choice. LSW requested bedside RN keep LSW informed of pt's son arrival.     Barriers to Discharge: none identified at this time    Plan: Awaiting arrival of pt's son to obtain SNF choice.

## 2018-07-09 NOTE — CARE PLAN
Problem: Communication  Goal: The ability to communicate needs accurately and effectively will improve  Pt [place]. Provided pt with amplifiers as well as call light button raised button due to pt being blind.     Problem: Venous Thromboembolism (VTW)/Deep Vein Thrombosis (DVT) Prevention:  Goal: Patient will participate in Venous Thrombosis (VTE)/Deep Vein Thrombosis (DVT)Prevention Measures  Pt receiving scheduled heparing sub q.

## 2018-07-09 NOTE — PROGRESS NOTES
Pt c/o not being able to breath. Suction set up at bedside due to phlegm production.  at bedside pt currently sat at 90% on 4L per NC.

## 2018-07-09 NOTE — PROGRESS NOTES
Notified on call MD Plummer regarding pt c/o SOB and phlegm production. Upon assessment crackles heard throughout. NS running at 75 discontinued per MD.

## 2018-07-10 ENCOUNTER — HOSPICE ADMISSION (OUTPATIENT)
Dept: HOSPICE | Facility: HOSPICE | Age: 83
End: 2018-07-10
Payer: MEDICARE

## 2018-07-10 PROCEDURE — 99231 SBSQ HOSP IP/OBS SF/LOW 25: CPT | Performed by: FAMILY MEDICINE

## 2018-07-10 PROCEDURE — G8979 MOBILITY GOAL STATUS: HCPCS | Mod: CL

## 2018-07-10 PROCEDURE — G8978 MOBILITY CURRENT STATUS: HCPCS | Mod: CL

## 2018-07-10 PROCEDURE — G8980 MOBILITY D/C STATUS: HCPCS | Mod: CL

## 2018-07-10 PROCEDURE — 770006 HCHG ROOM/CARE - MED/SURG/GYN SEMI*

## 2018-07-10 ASSESSMENT — PAIN SCALES - GENERAL
PAINLEVEL_OUTOF10: 0
PAINLEVEL_OUTOF10: 0

## 2018-07-10 NOTE — DISCHARGE SUMMARY
Discharge Summary    CHIEF COMPLAINT ON ADMISSION  No chief complaint on file.      Reason for Admission  Left hand cellulitis     Admission Date  7/4/2018    CODE STATUS  Comfort Care/DNR    HPI & HOSPITAL COURSE  This is a 89 y.o. female here with  2 day history of L hand, wrist and forearm swelling.  She has cognitive deficits but did give bits and pieces of her history.  She actually confirmed she has had the swelling and pain for 2 days now, 2 days ago she just woke up with it. She denied trauma or falls. She tells me she lives with her son at home. She gets around with a walker.   She was seen at outside hospital and was worked up. Reportedly she has a leukocytosis and Xrays showed possible wrist osteitis and osteophytes.  She was admitted with right wrist cellulitis and ct of the wrist ruled out necrotizing facitis.she was also seen by ortho and  Recommended  Antibiotics only.she has finished her 5 days course of antibiotics.she also is in acute on chronioc renal failure and lasix and zoraxlyn are dced. And due to decreased creatinin clearance her sotalol is discontinued.I spoke  With the pt's son and he agrees with comfort care and hospice only and no medical intervention.hospice is consulted  And she will be discharged to a facility with hospice once she is accepted.       Therefore, she is discharged in guarded and stable condition     The patient met 2-midnight criteria for an inpatient stay at the time of discharge.    Discharge Date  7/10/18    FOLLOW UP ITEMS POST DISCHARGE  hospice    DISCHARGE DIAGNOSES  Principal Problem:    Left arm swelling POA: Unknown  Active Problems:    History of heart block s/p pacer POA: Yes    Hypokalemia POA: Yes    Dementia POA: Unknown    Renal insufficiency POA: Unknown    HTN (hypertension) POA: Unknown  Resolved Problems:    * No resolved hospital problems. *      FOLLOW UP  No future appointments.  No follow-up provider specified.    MEDICATIONS ON DISCHARGE      Medication List      CONTINUE taking these medications      Instructions   dorzolamide-timolol 22.3-6.8 MG/ML Soln  Commonly known as:  COSOPT   Place 1 Drop in left eye 2 times a day. Indications: Wide-Angle Glaucoma  Dose:  1 Drop     latanoprost 0.005 % Soln  Commonly known as:  XALATAN   Place 1 Drop in left eye every bedtime.  Dose:  1 Drop     NIFEDIAC CC 30 MG CR tablet  Generic drug:  NIFEdipine   Take 30 mg by mouth every morning.  Dose:  30 mg        STOP taking these medications    furosemide 40 MG Tabs  Commonly known as:  LASIX     metOLazone 5 MG Tabs  Commonly known as:  ZAROXOLYN     potassium Chloride ER 20 MEQ Tbcr tablet  Commonly known as:  K-TAB     sotalol 80 MG Tabs  Commonly known as:  BETAPACE            Allergies  No Known Allergies    DIET  Orders Placed This Encounter   Procedures   • Diet Order Regular     Standing Status:   Standing     Number of Occurrences:   1     Order Specific Question:   Diet:     Answer:   Regular [1]     Order Specific Question:   Texture/Fiber modifications:     Answer:   Dysphagia 2(Pureed/Chopped)specify fluid consistency(question 6) [2]       ACTIVITY  As tolerated.      CONSULTATIONS  Ortho  hospice    PROCEDURES  none    LABORATORY  Lab Results   Component Value Date    SODIUM 138 07/09/2018    POTASSIUM 3.5 (L) 07/09/2018    CHLORIDE 95 (L) 07/09/2018    CO2 30 07/09/2018    GLUCOSE 101 (H) 07/09/2018    BUN 45 (H) 07/09/2018    CREATININE 2.14 (H) 07/09/2018        Lab Results   Component Value Date    WBC 13.7 (H) 07/09/2018    HEMOGLOBIN 9.3 (L) 07/09/2018    HEMATOCRIT 28.6 (L) 07/09/2018    PLATELETCT 235 07/09/2018        Total time of the discharge process exceeds 35 minutes.

## 2018-07-10 NOTE — CARE PLAN
Problem: Bowel/Gastric:  Goal: Normal bowel function is maintained or improved  Outcome: PROGRESSING AS EXPECTED  Patient states usual bowel movement. Patient had bowel movement during past 24 hour period, without any difficulty.    Problem: Knowledge Deficit  Goal: Knowledge of disease process/condition, treatment plan, diagnostic tests, and medications will improve  Outcome: PROGRESSING AS EXPECTED  Patient educated on disease process and medication prescribed for disease process. Patient unable to show understanding/comprehension of teaching.

## 2018-07-10 NOTE — DISCHARGE PLANNING
Agency/Facility Name: [facility]   Outcome: Patient accepted. ANA Gomez notified.

## 2018-07-10 NOTE — THERAPY
Pt has been transitioned to comfort care, PT signing off at this time     Carlie Atkinson PT/DPT  Pager# [phone]

## 2018-07-10 NOTE — DISCHARGE PLANNING
Anticipated Discharge Disposition: Hospice    Action: Pt discussed in IDT rounds today and LSW was informed that pt was now comfort care and LSW need to discuss placement options w/ pt's son. LSW called pt's son, Bartolo, to discuss placement. Bartolo stated that he wanted the pt to go to [facility] to get PT/OT to get stronger and come home. LSW explained that when the pt was switched to comfort care that [facility] will not take the pt now due to pt being comfort care and requiring long-term care. Bartolo asked to speak to MD regarding this because Bartolo said this was not what was discussed yesterday (7/9) when he was here visiting pt at [facility].         Barriers to Discharge: Placement     Plan: LSW met w/ MD and requested a PC be made to pt's son Bartolo to discuss comfort care status.

## 2018-07-10 NOTE — PROGRESS NOTES
"Received bedside report from off going nurse. Assumed care of pt. @ 1900. Initial assessment completed. Patient in bed, A/O x 1. Sitting up in bed. No acute distress. Denies pain. Reviewed care plan w/patient. Questions answered. Comfort care and monitoring ongoing. Call lights w/in reach. Bed locked in lowest position /69   Pulse 71   Temp 37.4 °C (99.4 °F)   Resp 18   Ht 1.52 m (4' 11.84\")   Wt 50.4 kg (111 lb 1.8 oz)   SpO2 92%   Breastfeeding? No   BMI 21.81 kg/m²   "

## 2018-07-10 NOTE — PROGRESS NOTES
Pt was alert and oriented to person only. Safe and free from falls. Bed alarm in place and turned on. Pt tolerated solid but soft foods. Sleeping between care. Pt was intermittently confused and sat at edge of bed and was incontinent. All precautions maintained. Voiding adequate amounts. Pt on comfort care. VSS. Pt afebrile. CM and SW working with pt and family for placement. Pt does have IV but is saline locked. Pt denies any pain.

## 2018-07-10 NOTE — CARE PLAN
Problem: Safety  Goal: Will remain free from injury  Outcome: PROGRESSING AS EXPECTED  Call light within reach. Hourly rounding completed. Pt safe and free from falls.    Problem: Infection  Goal: Will remain free from infection  Outcome: PROGRESSING AS EXPECTED  Hand and personal hygiene promoted. All precautions maintained going in and out of rooms.

## 2018-07-10 NOTE — PROGRESS NOTES
RN tried to help patient eat but patient swatted spoon away and said she wasn't hungry. RN will try again later to ask patient if she is hungry.

## 2018-07-11 PROBLEM — I49.8 ATRIAL ARRHYTHMIA: Status: ACTIVE | Noted: 2018-07-11

## 2018-07-11 PROBLEM — I48.91 A-FIB (HCC): Status: ACTIVE | Noted: 2018-07-11

## 2018-07-11 PROBLEM — I48.91 A-FIB (HCC): Status: RESOLVED | Noted: 2018-07-11 | Resolved: 2018-07-11

## 2018-07-11 LAB
ALBUMIN SERPL BCP-MCNC: 2.4 G/DL (ref 3.2–4.9)
ALBUMIN/GLOB SERPL: 0.7 G/DL
ALP SERPL-CCNC: 62 U/L (ref 30–99)
ALT SERPL-CCNC: <5 U/L (ref 2–50)
ANION GAP SERPL CALC-SCNC: 11 MMOL/L (ref 0–11.9)
AST SERPL-CCNC: 10 U/L (ref 12–45)
BILIRUB SERPL-MCNC: 0.3 MG/DL (ref 0.1–1.5)
BUN SERPL-MCNC: 52 MG/DL (ref 8–22)
CALCIUM SERPL-MCNC: 8.4 MG/DL (ref 8.5–10.5)
CHLORIDE SERPL-SCNC: 96 MMOL/L (ref 96–112)
CO2 SERPL-SCNC: 32 MMOL/L (ref 20–33)
CREAT SERPL-MCNC: 2 MG/DL (ref 0.5–1.4)
GLOBULIN SER CALC-MCNC: 3.6 G/DL (ref 1.9–3.5)
GLUCOSE SERPL-MCNC: 114 MG/DL (ref 65–99)
POTASSIUM SERPL-SCNC: 3.3 MMOL/L (ref 3.6–5.5)
PROT SERPL-MCNC: 6 G/DL (ref 6–8.2)
SODIUM SERPL-SCNC: 139 MMOL/L (ref 135–145)

## 2018-07-11 PROCEDURE — 36415 COLL VENOUS BLD VENIPUNCTURE: CPT

## 2018-07-11 PROCEDURE — 700102 HCHG RX REV CODE 250 W/ 637 OVERRIDE(OP): Performed by: FAMILY MEDICINE

## 2018-07-11 PROCEDURE — 99232 SBSQ HOSP IP/OBS MODERATE 35: CPT | Performed by: FAMILY MEDICINE

## 2018-07-11 PROCEDURE — 80053 COMPREHEN METABOLIC PANEL: CPT

## 2018-07-11 PROCEDURE — A9270 NON-COVERED ITEM OR SERVICE: HCPCS | Performed by: FAMILY MEDICINE

## 2018-07-11 PROCEDURE — 770006 HCHG ROOM/CARE - MED/SURG/GYN SEMI*

## 2018-07-11 RX ADMIN — METOPROLOL TARTRATE 25 MG: 25 TABLET, FILM COATED ORAL at 14:11

## 2018-07-11 ASSESSMENT — PAIN SCALES - GENERAL
PAINLEVEL_OUTOF10: 0
PAINLEVEL_OUTOF10: 0

## 2018-07-11 NOTE — DISCHARGE PLANNING
Anticipated Discharge Disposition: SNF    Action: LSW notified that pt's son, Bartolo, was at bedside and requesting to speak w/ SW. LSW met w/ Bartolo at bedside and Bartolo reports that he would like to see the pt transferred to [facility] for rehab to get stronger. Bartolo states that he disagrees with the decision for pt to go on Comfort Care and does not want to pursue hospice at this time. Bartolo was tearful and stated that he knows that his mother has more fight left in her and would like to have her transferred to [facility] for rehab and then back home. LSW notified MD and MD came to bedside and listened to sons request. MD agreed with pt's son and pt taken off comfort care.    LSW notified CASSANDRA Carlie, that referral for [facility] needs to be resent as pt is now off Comfort Care and pursuing SNF for PT/OT with dc plan to return home w/ son after rehab. LSW called [facility] ([phone]) and provided update that the pt is no longer comfort care and family does not want hospice at this time.     LSW also received VM from Pauline cruz/ [facility] who states that the pt was on their services prior to being admitted. Pauline verbalized her concerned that pt is going on comfort care and was advocating for the pt to be able to go to SNF for rehab to regain strength before sending the pt home to be bed bound. LSW attempted to call Pauline back but no answer; LSW left VM.     Barriers to Discharge: none    Plan: Awaiting acceptance to [facility].

## 2018-07-11 NOTE — PROGRESS NOTES
[facility] Hospitalist Progress Note    Date of Service: 2018    Chief Complaint  89 y.o. female admitted 2018 with left wrist cellulitis    Interval Problem Update  Acute on chronic RF    Consultants/Specialty  ortho   cardiology    Disposition  pending        Review of Systems   Unable to perform ROS: Dementia      Physical Exam  Laboratory/Imaging   Hemodynamics  Temp (24hrs), Av.7 °C (98 °F), Min:36.6 °C (97.9 °F), Max:36.7 °C (98.1 °F)   Temperature: 36.7 °C (98.1 °F)  Pulse  Av.4  Min: 62  Max: 91    Blood Pressure : 146/77      Respiratory      Respiration: 16, Pulse Oximetry: 94 %        RUL Breath Sounds: Crackles, RML Breath Sounds: Crackles, RLL Breath Sounds: Diminished, SUDHIR Breath Sounds: Crackles, LLL Breath Sounds: Diminished    Fluids    Intake/Output Summary (Last 24 hours) at 18 1221  Last data filed at 07/10/18 1739   Gross per 24 hour   Intake              240 ml   Output                0 ml   Net              240 ml       Nutrition  Orders Placed This Encounter   Procedures   • Diet Order Regular     Standing Status:   Standing     Number of Occurrences:   1     Order Specific Question:   Diet:     Answer:   Regular [1]     Order Specific Question:   Texture/Fiber modifications:     Answer:   Dysphagia 2(Pureed/Chopped)specify fluid consistency(question 6) [2]     Physical Exam   Constitutional: No distress.   HENT:   Right Ear: External ear normal.   Left Ear: External ear normal.   Eyes: Right eye exhibits no discharge. Left eye exhibits no discharge.   Blindness     Neck: No JVD present.   Cardiovascular: Normal heart sounds.    Pulmonary/Chest: No stridor. No respiratory distress. She has no wheezes. She has no rales.   Abdominal: She exhibits no distension. There is no tenderness. There is no rebound.   Musculoskeletal: She exhibits edema (left wrist and hand is warm to touch) and tenderness (left wrist).   Neurological: She is alert.   Skin: Skin is warm and dry. She is  not diaphoretic.       Recent Labs      07/09/18   0204   WBC  13.7*   RBC  3.17*   HEMOGLOBIN  9.3*   HEMATOCRIT  28.6*   MCV  90.2   MCH  29.3   MCHC  32.5*   RDW  52.2*   PLATELETCT  235   MPV  9.8     Recent Labs      07/09/18   0204   SODIUM  138   POTASSIUM  3.5*   CHLORIDE  95*   CO2  30   GLUCOSE  101*   BUN  45*   CREATININE  2.14*   CALCIUM  8.2*                      Assessment/Plan     * Left arm swelling   Assessment & Plan     CT of wrist showed:1.  No acute fracture or dislocation appreciated.    2.  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    3.  No soft tissue fluid collection or mass appreciated.  Completed 5 days of antibiotics  Ortho input is noted  It is  Better with pain with palpation of the area            History of heart block s/p pacer- (present on admission)   Assessment & Plan    Chronic  sotolol is dced 2nd to renal failure  D/w cardiology for consult        HTN (hypertension)   Assessment & Plan    nifidipine is dced  lopressor        Renal insufficiency   Assessment & Plan    2nd to overdiuresis  D/w the son with  in the room  Pt's son wants comfort care to be cancelled and  Only to be DNR/DNI  Will check bmp          Dementia   Assessment & Plan    Cognitive deficits  Monitor for delirium        Hypokalemia- (present on admission)   Assessment & Plan    resolved          Quality-Core Measures   DVT prophylaxis pharmacological::  Heparin

## 2018-07-11 NOTE — PROGRESS NOTES
RN called MD and asked him to call pt's son to let him know that patient is on comfort care. MD said he spoke to son x2 today at length and son is aware that his mother is on comfort care. RN also asked MD if patient would be going home with HH and hospice or going to skilled facility. MD said that CM/NILO would be working on that. RN will speak to MD tomorrow.

## 2018-07-11 NOTE — PROGRESS NOTES
1900 RN received report from day shift RN. Pt is A&O x1. Left eye blindness, hard of hearing. 4L NC. No s/s of distress, VSS. No c/o pain. Asking for assistance with eating, RN provided assistance with eating. Safety checks assessed & call bell within reach. RN assumes Plan of care.

## 2018-07-11 NOTE — DISCHARGE PLANNING
Anticipated Discharge Disposition: [facility]    Action: LSW received PC from Best cruz/ [facility] requesting the Order cancelling pt's comfort care status to be faxed to them. [facility] states that they will review the referral after Order is received and call us back w/ referral decision.     Barriers to Discharge: acceptance to SNF    Plan: Awaiting acceptance to SNF.

## 2018-07-11 NOTE — CONSULTS
Cardiology Consult Note:    Linsey To  Date & Time note created:    7/11/2018   12:23 PM     Referring MD:  Dr. Blanco    Patient ID:   Name:             Aileen Reis     YOB: 1927  Age:                 89 y.o.  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  During her hospital stay, patient was placed on comfort cares but her son later on changed to decision.  Patient is DNR and DNI.  Cardiology has been consulted for further management of atrial arrhythmia.    No prior cardiac procedure surgery.  No family history of sudden cardiac death.    Her 12-lead EKG did not show evidence of acute coronary syndrome.    Review of Systems:      Patient has baseline dementia and was not able to recall much information.  She is communicative and does not report of having chest pain or shortness of breath.          Past Medical History:   Past Medical History:   Diagnosis Date   • A-fib (HCC)    • Atrial arrhythmia 7/11/2018   • Hypertension    • Pacemaker 6/2/2015   • Syncope 5/24/2015     Active Hospital Problems    Diagnosis   • Left arm swelling [M79.89]     Priority: High   • History of heart block s/p pacer [Z86.79]     Priority: Medium   • Atrial arrhythmia [I49.8]   • Renal insufficiency [N28.9]   • HTN (hypertension) [I10]   • Dementia [F03.90]   • Hypokalemia [E87.6]       Past Surgical History:  Past Surgical History:   Procedure Laterality Date   • OTHER CARDIAC SURGERY     • OTHER ORTHOPEDIC SURGERY      L wrist, broke while roller skating and L hip       Hospital Medications:    Current Facility-Administered Medications:   •  metoprolol  (LOPRESSOR) tablet 25 mg, 25 mg, Oral, TWICE DAILY, Argenis Blanco M.D.  •  MD ALERT...adult comfort care, , Other, PRN, Argeins Blanco M.D.  •  atropine 1 % ophthalmic solution 2 Drop, 2 Drop, Sublingual, Q4HRS PRN, Argenis Blanco M.D.  •  morphine (ROXANOL) 20 MG/ML oral conc 10 mg, 10 mg, Oral, Q HOUR PRN **OR** morphine (pf) 10 mg/ml 10 MG/ML injection 5 mg, 5 mg, Intravenous, Q HOUR PRN **OR** morphine (pf) 10 mg/ml 10 MG/ML injection 10 mg, 10 mg, Intravenous, Q HOUR PRN, Argenis Blanco M.D.  •  LORazepam (ATIVAN) 2 MG/ML oral conc 1 mg, 1 mg, Sublingual, Q HOUR PRN **OR** LORazepam (ATIVAN) injection 1 mg, 1 mg, Intravenous, Q HOUR PRN, Argenis Blanco M.D.  •  [DISCONTINUED] senna-docusate (PERICOLACE or SENOKOT S) 8.6-50 MG per tablet 2 Tab, 2 Tab, Oral, BID, 2 Tab at 07/08/18 2007 **AND** polyethylene glycol/lytes (MIRALAX) PACKET 1 Packet, 1 Packet, Oral, QDAY PRN **AND** magnesium hydroxide (MILK OF MAGNESIA) suspension 30 mL, 30 mL, Oral, QDAY PRN **AND** bisacodyl (DULCOLAX) suppository 10 mg, 10 mg, Rectal, QDAY PRN, Noel Plummer M.D.  •  acetaminophen (TYLENOL) tablet 650 mg, 650 mg, Oral, Q6HRS PRN, Noel Plummer M.D.    Current Outpatient Medications:  Prescriptions Prior to Admission   Medication Sig Dispense Refill Last Dose   • NIFEdipine (NIFEDIAC CC) 30 MG CR tablet Take 30 mg by mouth every morning.   7/4/2018 at 1000   • [DISCONTINUED] sotalol (BETAPACE) 80 MG Tab Take 80 mg by mouth every bedtime.   7/3/2018 at Unknown time   • [DISCONTINUED] metOLazone (ZAROXOLYN) 5 MG Tab Take 5 mg by mouth every morning.   7/4/2018 at 1000   • [DISCONTINUED] furosemide (LASIX) 40 MG Tab Take 40 mg by mouth 2 Times a Day.  0 7/4/2018 at 1000   • [DISCONTINUED] potassium Chloride ER (K-TAB) 20 MEQ Tab CR tablet Take 40 mEq by mouth 2 Times a Day.  0 7/4/2018 at 1000   • latanoprost (XALATAN) 0.005 % Solution Place 1 Drop in left eye every bedtime.  0 7/3/2018 at  "Unknown time   • dorzolamide-timolol (COSOPT) 22.3-6.8 MG/ML SOLN Place 1 Drop in left eye 2 times a day. Indications: Wide-Angle Glaucoma   7/4/2018 at am       Medication Allergy:  No Known Allergies    Family History:  Family History   Problem Relation Age of Onset   • Suicide Attempts Sister    • Psychiatry Sister    • Heart Disease Brother    • Alzheimer's Disease Brother    • Heart Attack Father    • Psychiatry Mother      in/out mental institutions       Social History:  Social History     Social History   • Marital status:      Spouse name: N/A   • Number of children: N/A   • Years of education: N/A     Occupational History   • Not on file.     Social History Main Topics   • Smoking status: Former Smoker     Types: Cigarettes   • Smokeless tobacco: Never Used   • Alcohol use No   • Drug use: No   • Sexual activity: Not on file     Other Topics Concern   • Not on file     Social History Narrative   • No narrative on file         Physical Exam:  Vitals/ General Appearance:   Weight/BMI: Body mass index is 21.81 kg/m².  Blood pressure 146/77, pulse 62, temperature 36.7 °C (98.1 °F), resp. rate 16, height 1.52 m (4' 11.84\"), weight 50.4 kg (111 lb 1.8 oz), SpO2 94 %, not currently breastfeeding.  Vitals:    07/10/18 0815 07/10/18 1739 07/10/18 1959 07/11/18 0350   BP: 122/81 136/69 118/63 146/77   Pulse: 75 69 68 62   Resp: 16 18 18 16   Temp: 36.2 °C (97.2 °F) 36.7 °C (98 °F) 36.6 °C (97.9 °F) 36.7 °C (98.1 °F)   SpO2: 98% 94% 95% 94%   Weight:       Height:         Oxygen Therapy:  Pulse Oximetry: 94 %, O2 (LPM): 4, O2 Delivery: Nasal Cannula    Constitutional:   No acute distress, decondinting  HENMT:  Normocephalic, Atraumatic, Oropharynx moist mucous membranes, No oral exudates, Nose normal.  No thyromegaly.  Eyes:  EOMI, Conjunctiva normal, No discharge.  Neck:  Normal range of motion, No cervical tenderness,  no JVD.  Cardiovascular:  Normal heart rate, Normal rhythm, No murmurs, No rubs, No " gallops.   Extremitites with intact distal pulses, no cyanosis, or edema.  Lungs:  Normal breath sounds, breath sounds clear to auscultation bilaterally,  no rales, no rhonchi, no wheezing.   Abdomen: Bowel sounds normal, Soft, No tenderness, No guarding, No rebound, No masses, No hepatosplenomegaly.  Skin: Warm, Dry, No erythema, No rash, no induration.  Neurologic: awake and communicative.  Psychiatric: demented.      MDM (Data Review):     Records reviewed and summarized in current documentation    Lab Data Review:  No results found for this or any previous visit (from the past 24 hour(s)).    Imaging/Procedures Review:    Chest Xray:  Reviewed    EKG:   As in HPI.     MDM (Assessment and Plan):     Active Hospital Problems    Diagnosis   • Left arm swelling [M79.89]     Priority: High   • History of heart block s/p pacer [Z86.79]     Priority: Medium   • Atrial arrhythmia [I49.8]   • Renal insufficiency [N28.9]   • HTN (hypertension) [I10]   • Dementia [F03.90]   • Hypokalemia [E87.6]         At this time, no further cardiac intervention.  No further cardiac procedure.  No need to strictly control her rate.  In advanced age population, strict rate control has been shown to increase mortality.  Therefore, okay to be off of beta-blocker.  Quality of life is the most important aspect of her medical care.      Linsey Krishna MD.  Cardiology Inpatient Service.  [facility].  [phone].  [place], [place].

## 2018-07-11 NOTE — ASSESSMENT & PLAN NOTE
Rate is controlled  sotolol is dced 2nd to RF and cardiology agreed  Lopressor  Not on anticoagulation from home  High risk for fall

## 2018-07-12 ENCOUNTER — APPOINTMENT (OUTPATIENT)
Dept: RADIOLOGY | Facility: MEDICAL CENTER | Age: 83
DRG: 602 | End: 2018-07-12
Attending: FAMILY MEDICINE
Payer: MEDICARE

## 2018-07-12 PROBLEM — J18.9 PNEUMONIA: Status: ACTIVE | Noted: 2018-07-12

## 2018-07-12 LAB
ALBUMIN SERPL BCP-MCNC: 2.6 G/DL (ref 3.2–4.9)
ALBUMIN/GLOB SERPL: 0.7 G/DL
ALP SERPL-CCNC: 61 U/L (ref 30–99)
ALT SERPL-CCNC: <5 U/L (ref 2–50)
ANION GAP SERPL CALC-SCNC: 10 MMOL/L (ref 0–11.9)
AST SERPL-CCNC: 11 U/L (ref 12–45)
BASOPHILS # BLD AUTO: 0.7 % (ref 0–1.8)
BASOPHILS # BLD: 0.05 K/UL (ref 0–0.12)
BILIRUB SERPL-MCNC: 0.3 MG/DL (ref 0.1–1.5)
BUN SERPL-MCNC: 49 MG/DL (ref 8–22)
CALCIUM SERPL-MCNC: 8.7 MG/DL (ref 8.5–10.5)
CHLORIDE SERPL-SCNC: 96 MMOL/L (ref 96–112)
CO2 SERPL-SCNC: 33 MMOL/L (ref 20–33)
CREAT SERPL-MCNC: 1.67 MG/DL (ref 0.5–1.4)
EOSINOPHIL # BLD AUTO: 0.24 K/UL (ref 0–0.51)
EOSINOPHIL NFR BLD: 3.2 % (ref 0–6.9)
ERYTHROCYTE [DISTWIDTH] IN BLOOD BY AUTOMATED COUNT: 51 FL (ref 35.9–50)
GLOBULIN SER CALC-MCNC: 3.7 G/DL (ref 1.9–3.5)
GLUCOSE SERPL-MCNC: 92 MG/DL (ref 65–99)
HCT VFR BLD AUTO: 31.5 % (ref 37–47)
HGB BLD-MCNC: 10.1 G/DL (ref 12–16)
IMM GRANULOCYTES # BLD AUTO: 0.07 K/UL (ref 0–0.11)
IMM GRANULOCYTES NFR BLD AUTO: 0.9 % (ref 0–0.9)
LYMPHOCYTES # BLD AUTO: 1.3 K/UL (ref 1–4.8)
LYMPHOCYTES NFR BLD: 17.6 % (ref 22–41)
MCH RBC QN AUTO: 28.9 PG (ref 27–33)
MCHC RBC AUTO-ENTMCNC: 32.1 G/DL (ref 33.6–35)
MCV RBC AUTO: 90 FL (ref 81.4–97.8)
MONOCYTES # BLD AUTO: 0.63 K/UL (ref 0–0.85)
MONOCYTES NFR BLD AUTO: 8.5 % (ref 0–13.4)
NEUTROPHILS # BLD AUTO: 5.1 K/UL (ref 2–7.15)
NEUTROPHILS NFR BLD: 69.1 % (ref 44–72)
NRBC # BLD AUTO: 0 K/UL
NRBC BLD-RTO: 0 /100 WBC
PLATELET # BLD AUTO: 247 K/UL (ref 164–446)
PMV BLD AUTO: 10.1 FL (ref 9–12.9)
POTASSIUM SERPL-SCNC: 3 MMOL/L (ref 3.6–5.5)
PROT SERPL-MCNC: 6.3 G/DL (ref 6–8.2)
RBC # BLD AUTO: 3.5 M/UL (ref 4.2–5.4)
SODIUM SERPL-SCNC: 139 MMOL/L (ref 135–145)
WBC # BLD AUTO: 7.4 K/UL (ref 4.8–10.8)

## 2018-07-12 PROCEDURE — 770006 HCHG ROOM/CARE - MED/SURG/GYN SEMI*

## 2018-07-12 PROCEDURE — 71045 X-RAY EXAM CHEST 1 VIEW: CPT

## 2018-07-12 PROCEDURE — 85025 COMPLETE CBC W/AUTO DIFF WBC: CPT

## 2018-07-12 PROCEDURE — 80053 COMPREHEN METABOLIC PANEL: CPT

## 2018-07-12 PROCEDURE — A9270 NON-COVERED ITEM OR SERVICE: HCPCS | Performed by: FAMILY MEDICINE

## 2018-07-12 PROCEDURE — 36415 COLL VENOUS BLD VENIPUNCTURE: CPT

## 2018-07-12 PROCEDURE — 700105 HCHG RX REV CODE 258: Performed by: FAMILY MEDICINE

## 2018-07-12 PROCEDURE — 700102 HCHG RX REV CODE 250 W/ 637 OVERRIDE(OP): Performed by: FAMILY MEDICINE

## 2018-07-12 PROCEDURE — 700111 HCHG RX REV CODE 636 W/ 250 OVERRIDE (IP): Performed by: FAMILY MEDICINE

## 2018-07-12 PROCEDURE — 99232 SBSQ HOSP IP/OBS MODERATE 35: CPT | Performed by: FAMILY MEDICINE

## 2018-07-12 RX ORDER — POTASSIUM CHLORIDE 20 MEQ/1
40 TABLET, EXTENDED RELEASE ORAL ONCE
Status: COMPLETED | OUTPATIENT
Start: 2018-07-12 | End: 2018-07-12

## 2018-07-12 RX ORDER — NIFEDIPINE 30 MG/1
30 TABLET, EXTENDED RELEASE ORAL
Status: DISCONTINUED | OUTPATIENT
Start: 2018-07-12 | End: 2018-07-13 | Stop reason: HOSPADM

## 2018-07-12 RX ORDER — LINEZOLID 600 MG/1
600 TABLET, FILM COATED ORAL EVERY 12 HOURS
Status: DISCONTINUED | OUTPATIENT
Start: 2018-07-12 | End: 2018-07-12

## 2018-07-12 RX ADMIN — METOPROLOL TARTRATE 25 MG: 25 TABLET, FILM COATED ORAL at 17:48

## 2018-07-12 RX ADMIN — PIPERACILLIN SODIUM AND TAZOBACTAM SODIUM 3.38 G: 3; .375 INJECTION, POWDER, FOR SOLUTION INTRAVENOUS at 21:00

## 2018-07-12 RX ADMIN — NIFEDIPINE 30 MG: 30 TABLET, FILM COATED, EXTENDED RELEASE ORAL at 16:01

## 2018-07-12 RX ADMIN — POTASSIUM CHLORIDE 40 MEQ: 1500 TABLET, EXTENDED RELEASE ORAL at 12:17

## 2018-07-12 RX ADMIN — PIPERACILLIN AND TAZOBACTAM 3.38 G: 3; .375 INJECTION, POWDER, LYOPHILIZED, FOR SOLUTION INTRAVENOUS; PARENTERAL at 12:15

## 2018-07-12 RX ADMIN — METOPROLOL TARTRATE 25 MG: 25 TABLET, FILM COATED ORAL at 04:29

## 2018-07-12 RX ADMIN — PIPERACILLIN SODIUM AND TAZOBACTAM SODIUM 3.38 G: 3; .375 INJECTION, POWDER, FOR SOLUTION INTRAVENOUS at 16:00

## 2018-07-12 ASSESSMENT — PAIN SCALES - GENERAL
PAINLEVEL_OUTOF10: 0
PAINLEVEL_OUTOF10: 0

## 2018-07-12 NOTE — PROGRESS NOTES
CNA informed this RN that the patient's SpO2 was in the mid to high 80's on 5L of O2 via NC. Charge RN and myself changed her to a mask and bumped her to 8L. SpO2 improved to low 90's.    MD Blanco notified.

## 2018-07-12 NOTE — DOCUMENTATION QUERY
DOCUMENTATION QUERY    PROVIDERS: Please select “Cosign w/ note”to reply to query.    To better represent the severity of illness of your patient, please review the following information and exercise your independent professional judgment in responding to this query.     Chronic kidney disease, stage unspecified is documented in the Progress Notes and Discharge Summary. Based upon the clinical findings, risk factors, and treatment, can this diagnosis be further specified?    • Chronic Kidney Disease Stage I      • Chronic Kidney Disease Stage II     • Chronic Kidney Disease Stage III    • Chronic Kidney Disease Stage IV    • Chronic Kidney Disease Stage V     • End Stage Renal Disease  • Other explanation of clinical findings  • Unable to determine    The medical record reflects the following:   Clinical Findings DC summary:  -Acute on chronic renal failure  Lab results:  -7/4: Bun 56, Creatinine 1.36, GFR 36  -7/8: Bun 43, Creatinine 1.65, GFR 29   Treatment NS IV infusion  Hold lasix  Monitor labs   Risk Factors Advanced age  Hypokalemia  HTN  Pacemaker  Dementia  Cellulitis of wrist   Location within medical record Progress Notes, Discharge Summary and Lab Results      Thank you,   Ed Robles RN BSN  Clinical   [phone] [facility] office  [phone] Cell phone      Definitions of CKD stages & ESRD are documented in chart below.     CKD Stage    Description    GFR (mL/min/1.73 m2)      1    Kidney damage with normal or elevated GFR    Greater than 90      2    Kidney damage with mild decrease in GFR    60-89      3    Moderate decrease in GFR                       30-59      4    Severe decrease in GFR    15-29      5    Kidney failure  Less than 15  (or dialysis)      ESRD    Renal replacement therapy    Dialysis or transplantation   Source: [employer]

## 2018-07-12 NOTE — DISCHARGE PLANNING
Anticipated Discharge Disposition: SNF    Action: AUREA FORBES left message for Michi Sutherland at [facility], [phone], to discuss skilled needs that pt will require upon discharge from [facility].    Barriers to Discharge: Medical clearance, resp status deteriorated overnight, accepting SNF.    Plan: RN CM to update son at bedside and cont to follow-up with [facility].

## 2018-07-12 NOTE — PROGRESS NOTES
1900 RN received report from day shift RN. Pt is A&O to self, resting comfortably in bed. 4L NC, VSS. Safety checks assessed & call bell within reach.

## 2018-07-12 NOTE — PROGRESS NOTES
[facility]ist Progress Note    Date of Service: 2018    Chief Complaint  89 y.o. female admitted 2018 with left wrist cellulitis    Interval Problem Update  Acute on chronic RF  pneumonia    Consultants/Specialty  ortho   cardiology    Disposition  pending        Review of Systems   Unable to perform ROS: Dementia      Physical Exam  Laboratory/Imaging   Hemodynamics  Temp (24hrs), Av.8 °C (98.2 °F), Min:36.6 °C (97.8 °F), Max:37.3 °C (99.1 °F)   Temperature: 36.7 °C (98 °F)  Pulse  Av  Min: 60  Max: 91    Blood Pressure : 141/75      Respiratory      Respiration: 17, Pulse Oximetry: 94 %        RUL Breath Sounds: Crackles, RML Breath Sounds: Crackles, RLL Breath Sounds: Diminished, SUDHIR Breath Sounds: Crackles, LLL Breath Sounds: Diminished    Fluids  No intake or output data in the 24 hours ending 18 0929    Nutrition  Orders Placed This Encounter   Procedures   • Diet Order Regular     Standing Status:   Standing     Number of Occurrences:   1     Order Specific Question:   Diet:     Answer:   Regular [1]     Order Specific Question:   Texture/Fiber modifications:     Answer:   Dysphagia 2(Pureed/Chopped)specify fluid consistency(question 6) [2]     Physical Exam   Constitutional: No distress.   HENT:   Head: Normocephalic and atraumatic.   Eyes: Conjunctivae are normal. Pupils are equal, round, and reactive to light.   Blindness     Neck: Normal range of motion. Neck supple.   Cardiovascular: Normal rate and regular rhythm.    Pulmonary/Chest: She has rales. She exhibits no tenderness.   Abdominal: Soft. Bowel sounds are normal.   Musculoskeletal: She exhibits no edema (left wrist and hand is warm to touch) or tenderness (left wrist).   Neurological: She is alert.   Skin: Skin is warm and dry. She is not diaphoretic.       Recent Labs      18   0218   WBC  7.4   RBC  3.50*   HEMOGLOBIN  10.1*   HEMATOCRIT  31.5*   MCV  90.0   MCH  28.9   MCHC  32.1*   RDW  51.0*   PLATELETCT   247   MPV  10.1     Recent Labs      07/11/18   1307  07/12/18   0218   SODIUM  139  139   POTASSIUM  3.3*  3.0*   CHLORIDE  96  96   CO2  32  33   GLUCOSE  114*  92   BUN  52*  49*   CREATININE  2.00*  1.67*   CALCIUM  8.4*  8.7                      Assessment/Plan     * Left arm swelling   Assessment & Plan     CT of wrist showed:1.  No acute fracture or dislocation appreciated.    2.  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    3.  No soft tissue fluid collection or mass appreciated.  Completed 5 days of antibiotics  Ortho input is noted  Has resolved            History of heart block s/p pacer- (present on admission)   Assessment & Plan    Chronic  sotolol is dced 2nd to renal failure  D/w cardiology  And agreed with dcing sotolol and starting lopressor        Pneumonia   Assessment & Plan    Hypoxia  cxray is noted  Zosyn  Due to renal failure will start zyvox instead of  Vancomycin  O2        HTN (hypertension)   Assessment & Plan    nifidipine  lopressor        Renal insufficiency   Assessment & Plan    2nd to overdiuresis  D/w the son with  in the room  Pt's son wants comfort care to be cancelled and  Only to be DNR/DNI  At base line          Dementia   Assessment & Plan    Cognitive deficits  Monitor for delirium        Hypokalemia- (present on admission)   Assessment & Plan    kdur          Quality-Core Measures   DVT prophylaxis pharmacological::  Heparin

## 2018-07-13 VITALS
HEART RATE: 71 BPM | OXYGEN SATURATION: 94 % | DIASTOLIC BLOOD PRESSURE: 62 MMHG | SYSTOLIC BLOOD PRESSURE: 133 MMHG | RESPIRATION RATE: 20 BRPM | HEIGHT: 60 IN | WEIGHT: 111.11 LBS | TEMPERATURE: 98.5 F | BODY MASS INDEX: 21.81 KG/M2

## 2018-07-13 LAB
ALBUMIN SERPL BCP-MCNC: 2.5 G/DL (ref 3.2–4.9)
ALBUMIN/GLOB SERPL: 0.6 G/DL
ALP SERPL-CCNC: 62 U/L (ref 30–99)
ALT SERPL-CCNC: 7 U/L (ref 2–50)
ANION GAP SERPL CALC-SCNC: 7 MMOL/L (ref 0–11.9)
AST SERPL-CCNC: 14 U/L (ref 12–45)
BASOPHILS # BLD AUTO: 0.8 % (ref 0–1.8)
BASOPHILS # BLD: 0.06 K/UL (ref 0–0.12)
BILIRUB SERPL-MCNC: 0.4 MG/DL (ref 0.1–1.5)
BUN SERPL-MCNC: 33 MG/DL (ref 8–22)
CALCIUM SERPL-MCNC: 8.8 MG/DL (ref 8.5–10.5)
CHLORIDE SERPL-SCNC: 99 MMOL/L (ref 96–112)
CO2 SERPL-SCNC: 35 MMOL/L (ref 20–33)
CREAT SERPL-MCNC: 1.19 MG/DL (ref 0.5–1.4)
EOSINOPHIL # BLD AUTO: 0.21 K/UL (ref 0–0.51)
EOSINOPHIL NFR BLD: 2.9 % (ref 0–6.9)
ERYTHROCYTE [DISTWIDTH] IN BLOOD BY AUTOMATED COUNT: 52.1 FL (ref 35.9–50)
GLOBULIN SER CALC-MCNC: 4 G/DL (ref 1.9–3.5)
GLUCOSE SERPL-MCNC: 94 MG/DL (ref 65–99)
HCT VFR BLD AUTO: 33.2 % (ref 37–47)
HGB BLD-MCNC: 10.3 G/DL (ref 12–16)
IMM GRANULOCYTES # BLD AUTO: 0.06 K/UL (ref 0–0.11)
IMM GRANULOCYTES NFR BLD AUTO: 0.8 % (ref 0–0.9)
LYMPHOCYTES # BLD AUTO: 1.33 K/UL (ref 1–4.8)
LYMPHOCYTES NFR BLD: 18.1 % (ref 22–41)
MCH RBC QN AUTO: 28.4 PG (ref 27–33)
MCHC RBC AUTO-ENTMCNC: 31 G/DL (ref 33.6–35)
MCV RBC AUTO: 91.5 FL (ref 81.4–97.8)
MONOCYTES # BLD AUTO: 0.66 K/UL (ref 0–0.85)
MONOCYTES NFR BLD AUTO: 9 % (ref 0–13.4)
NEUTROPHILS # BLD AUTO: 5.04 K/UL (ref 2–7.15)
NEUTROPHILS NFR BLD: 68.4 % (ref 44–72)
NRBC # BLD AUTO: 0 K/UL
NRBC BLD-RTO: 0 /100 WBC
PLATELET # BLD AUTO: 240 K/UL (ref 164–446)
PMV BLD AUTO: 9.3 FL (ref 9–12.9)
POTASSIUM SERPL-SCNC: 3.5 MMOL/L (ref 3.6–5.5)
PROT SERPL-MCNC: 6.5 G/DL (ref 6–8.2)
RBC # BLD AUTO: 3.63 M/UL (ref 4.2–5.4)
SODIUM SERPL-SCNC: 141 MMOL/L (ref 135–145)
WBC # BLD AUTO: 7.4 K/UL (ref 4.8–10.8)

## 2018-07-13 PROCEDURE — 700111 HCHG RX REV CODE 636 W/ 250 OVERRIDE (IP): Performed by: FAMILY MEDICINE

## 2018-07-13 PROCEDURE — A9270 NON-COVERED ITEM OR SERVICE: HCPCS | Performed by: FAMILY MEDICINE

## 2018-07-13 PROCEDURE — 85025 COMPLETE CBC W/AUTO DIFF WBC: CPT

## 2018-07-13 PROCEDURE — 99239 HOSP IP/OBS DSCHRG MGMT >30: CPT | Performed by: FAMILY MEDICINE

## 2018-07-13 PROCEDURE — 700105 HCHG RX REV CODE 258: Performed by: FAMILY MEDICINE

## 2018-07-13 PROCEDURE — 700102 HCHG RX REV CODE 250 W/ 637 OVERRIDE(OP): Performed by: FAMILY MEDICINE

## 2018-07-13 PROCEDURE — 80053 COMPREHEN METABOLIC PANEL: CPT

## 2018-07-13 PROCEDURE — 36415 COLL VENOUS BLD VENIPUNCTURE: CPT

## 2018-07-13 RX ADMIN — PIPERACILLIN SODIUM AND TAZOBACTAM SODIUM 3.38 G: 3; .375 INJECTION, POWDER, FOR SOLUTION INTRAVENOUS at 13:17

## 2018-07-13 RX ADMIN — METOPROLOL TARTRATE 25 MG: 25 TABLET, FILM COATED ORAL at 05:14

## 2018-07-13 RX ADMIN — PIPERACILLIN SODIUM AND TAZOBACTAM SODIUM 3.38 G: 3; .375 INJECTION, POWDER, FOR SOLUTION INTRAVENOUS at 06:00

## 2018-07-13 RX ADMIN — NIFEDIPINE 30 MG: 30 TABLET, FILM COATED, EXTENDED RELEASE ORAL at 05:14

## 2018-07-13 ASSESSMENT — PAIN SCALES - GENERAL: PAINLEVEL_OUTOF10: 0

## 2018-07-13 NOTE — PROGRESS NOTES
"Pt's SpO2 fluctuating in mid 80s. RN attempted to place simple mask on Pt, Pt refusing. Charge RN & This RN at bedside explained the importance of the simple oxygen mask and increasing her SpO2. Pt continues to refuse stating \"Don't I have my rights\" . RN increased oxygen on nasal cannula to 5-6 L, Pt SpO2 low 90s. RN WCTM  "

## 2018-07-13 NOTE — DISCHARGE INSTRUCTIONS
Discharge Instructions    Discharged to group home by ambulance with escort. Discharged via ambulance, hospital escort: Yes.  Special equipment needed: Not Applicable    Be sure to schedule a follow-up appointment with your primary care doctor or any specialists as instructed.     Discharge Plan:   Diet Plan: Discussed  Confirmed Symptoms Management: Discussed  Medication Reconciliation Updated: Yes  Pneumococcal Vaccine Administered/Refused: Not given - Patient refused pneumococcal vaccine  Influenza Vaccine Indication: Indicated: Not available from distributor/    I understand that a diet low in cholesterol, fat, and sodium is recommended for good health. Unless I have been given specific instructions below for another diet, I accept this instruction as my diet prescription.   Other diet: regular    Special Instructions: None    · Is patient discharged on Warfarin / Coumadin?   No     Depression / Suicide Risk    As you are discharged from this [facility] facility, it is important to learn how to keep safe from harming yourself.    Recognize the warning signs:  · Abrupt changes in personality, positive or negative- including increase in energy   · Giving away possessions  · Change in eating patterns- significant weight changes-  positive or negative  · Change in sleeping patterns- unable to sleep or sleeping all the time   · Unwillingness or inability to communicate  · Depression  · Unusual sadness, discouragement and loneliness  · Talk of wanting to die  · Neglect of personal appearance   · Rebelliousness- reckless behavior  · Withdrawal from people/activities they love  · Confusion- inability to concentrate     If you or a loved one observes any of these behaviors or has concerns about self-harm, here's what you can do:  · Talk about it- your feelings and reasons for harming yourself  · Remove any means that you might use to hurt yourself (examples: pills, rope, extension cords, firearm)  · Get  professional help from the community (Mental Health, Substance Abuse, psychological counseling)  · Do not be alone:Call your Safe Contact- someone whom you trust who will be there for you.  · Call your local CRISIS HOTLINE 074-5034 or 580-438-6579  · Call your local Children's Mobile Crisis Response Team [place] (503) 388-4351 or www.[employer]  · Call the toll free National Suicide Prevention Hotlines   · National Suicide Prevention Lifeline 032-060-PZJD (5008)  · National Hope Line Network 800-SUICIDE (091-9580)

## 2018-07-13 NOTE — PROGRESS NOTES
Assumed pt care at 0715.  Received report from night RN.  Assessment completed.  Pt disoriented to time and situation.  Pt has no comlaints of pain at this time.  No other s/s of discomfort or distress.  Bed in lowest position, locked, and bed alarm is on.  Pt calls appropriately.  Treaded socks in place, call light within reach and staff numbers provided.  Pt needs met at this time.

## 2018-07-13 NOTE — DISCHARGE SUMMARY
Discharge Summary    CHIEF COMPLAINT ON ADMISSION  No chief complaint on file.      Reason for Admission  Left hand cellulitis     Admission Date  7/4/2018    CODE STATUS  DNAR/DNI    HPI & HOSPITAL COURSE  This is a 89 y.o. female here with  2 day history of L hand, wrist and forearm swelling.  She has cognitive deficits but did give bits and pieces of her history.  She actually confirmed she has had the swelling and pain for 2 days now, 2 days ago she just woke up with it. She denied trauma or falls. She tells me she lives with her son at home. She gets around with a walker.   She was seen at outside hospital and was worked up. Reportedly she has a leukocytosis and Xrays showed possible wrist osteitis and osteophytes.  She was admitted with right wrist cellulitis and ct of the wrist ruled out necrotizing facitis.she was also seen by ortho and  Recommended  Antibiotics only.she has finished her 5 days course of antibiotics for her left wrist cellulitis.she also is in acute on chronioc renal failure and lasix and zoraxlyn are dced. And due to decreased creatinin clearance her sotalol is discontinued and d/w the cardiologist and is not recommending any b-blocker.I spoke  With the pt's son and he agreed with comfort care and hospice initially and later on decided against comfort care and hospice.However pt is not on comfort care or hospice any longer.Pt started to have shortness of breath and increased need for oxygen and cxray revealed pneumonia.she is placed on zosyn   And needs to continue for 6 more days.the son and the  have informed me that the local facility will accept the pt with being on zosyn.the pt's son wants her to be discharged there.  Therefore, she is discharged in guarded and stable condition     The patient met 2-midnight criteria for an inpatient stay at the time of discharge.    Discharge Date  7/13/18    FOLLOW UP ITEMS POST DISCHARGE  With the pcp    DISCHARGE DIAGNOSES  Principal  Problem:    Left arm swelling POA: Unknown  Active Problems:    History of heart block s/p pacer POA: Yes    Hypokalemia POA: Yes    Dementia POA: Unknown    Renal insufficiency POA: Unknown    HTN (hypertension) POA: Unknown    Atrial arrhythmia POA: Unknown    Pneumonia POA: Unknown  Resolved Problems:    A-fib (HCC) POA: Unknown      FOLLOW UP  No future appointments.  No follow-up provider specified.    MEDICATIONS ON DISCHARGE     Medication List      START taking these medications      Instructions   NS SOLN 100 mL with piperacillin-tazobactam 3.375 (3-0.375) g SOLR 3.375 g   3.375 g by Intravenous route every 8 hours.  Dose:  3.375 g        CONTINUE taking these medications      Instructions   dorzolamide-timolol 22.3-6.8 MG/ML Soln  Commonly known as:  COSOPT   Place 1 Drop in left eye 2 times a day. Indications: Wide-Angle Glaucoma  Dose:  1 Drop     latanoprost 0.005 % Soln  Commonly known as:  XALATAN   Place 1 Drop in left eye every bedtime.  Dose:  1 Drop     NIFEDIAC CC 30 MG CR tablet  Generic drug:  NIFEdipine   Take 30 mg by mouth every morning.  Dose:  30 mg        STOP taking these medications    furosemide 40 MG Tabs  Commonly known as:  LASIX     metOLazone 5 MG Tabs  Commonly known as:  ZAROXOLYN     potassium Chloride ER 20 MEQ Tbcr tablet  Commonly known as:  K-TAB     sotalol 80 MG Tabs  Commonly known as:  BETAPACE            Allergies  No Known Allergies    DIET  Orders Placed This Encounter   Procedures   • Diet Order Regular     Standing Status:   Standing     Number of Occurrences:   1     Order Specific Question:   Diet:     Answer:   Regular [1]     Order Specific Question:   Texture/Fiber modifications:     Answer:   Dysphagia 2(Pureed/Chopped)specify fluid consistency(question 6) [2]       ACTIVITY  As tolerated.      CONSULTATIONS  Ortho  hospice    PROCEDURES  none    LABORATORY  Lab Results   Component Value Date    SODIUM 141 07/13/2018    POTASSIUM 3.5 (L) 07/13/2018     CHLORIDE 99 07/13/2018    CO2 35 (H) 07/13/2018    GLUCOSE 94 07/13/2018    BUN 33 (H) 07/13/2018    CREATININE 1.19 07/13/2018        Lab Results   Component Value Date    WBC 7.4 07/13/2018    HEMOGLOBIN 10.3 (L) 07/13/2018    HEMATOCRIT 33.2 (L) 07/13/2018    PLATELETCT 240 07/13/2018        Total time of the discharge process exceeds 35 minutes.

## 2018-07-13 NOTE — DISCHARGE PLANNING
Anticipated Discharge Disposition: SNF    Action: LSW received PC from Michi Sutherland @ [facility]. LSW informed Michi that the pt is medically clear to transfer today and Michi states that they are able to take the pt today. Michi requested that updated DC summary be provided since pt is no longer on comfort care and no longer going on hospice. Michi also inquired about pt's IV ABX need and LSW faxed updated MAR to [phone]. Michi verbalized receipt of fax and Michi states they are expecting the pt today.     LSW completed PCS form and requested transport time of 1500 and LSW faxed to [facility], ([phone]). LSW received confirmation time of 1500. LSW called and spoke to pt's son, Bartolo, and informed son that pt will transfer today @ 1500 via [facility]. [place] agreeable to transfer and provided verbal authorization on COBRA and on IMM. Transport has been arranged via [facility] for today @ 1500. COBRA and transfer packet placed on pt's chart. Charge and bedside RN notified.     Barriers to Discharge: none    Plan: Pt to transfer to [facility] today via [facility] @ 1500.

## 2018-07-13 NOTE — PROGRESS NOTES
1900 RN received report from day shift RN. Pt is resting comfortably in bed. IV antibiotics infusing via PIV. 4L NC, VSS, NAD, No needs expressed at this time. Safety checks assessed & call bell within reach.

## 2018-07-13 NOTE — DISCHARGE PLANNING
Anticipated Discharge Disposition: SNF    Action: LSW followed up w/ Michi Sutherland at [facility] ([phone]) to discuss ability to take the pt.     Barriers to Discharge: Acceptance to SNF     Plan: LSW awaiting call back from [facility]

## 2018-07-13 NOTE — PROGRESS NOTES
D/C instructions discussed with pt and family members.  PIV removed. Pt discharged via [facility] with employee Rob Good to [facility].

## 2019-06-21 NOTE — DISCHARGE PLANNING
Received Choice form at 1550  Agency/Facility Name: [facility]    Referral sent per Choice form at 1553  Choice obtained by ANA Gomez.   Consent: Written consent obtained. Risks include but not limited to lid/brow ptosis, bruising, swelling, diplopia, temporary effect, incomplete chemical denervation.

## 2020-01-12 ENCOUNTER — HOSPITAL ENCOUNTER (OUTPATIENT)
Facility: MEDICAL CENTER | Age: 85
End: 2020-01-12
Payer: MEDICARE

## 2020-01-17 ENCOUNTER — ANESTHESIA EVENT (OUTPATIENT)
Dept: SURGERY | Facility: MEDICAL CENTER | Age: 85
End: 2020-01-17
Payer: MEDICARE

## 2020-01-17 ENCOUNTER — ANESTHESIA (OUTPATIENT)
Dept: SURGERY | Facility: MEDICAL CENTER | Age: 85
End: 2020-01-17
Payer: MEDICARE

## 2020-01-17 ENCOUNTER — HOSPITAL ENCOUNTER (OUTPATIENT)
Facility: MEDICAL CENTER | Age: 85
End: 2020-01-17
Attending: OPHTHALMOLOGY | Admitting: OPHTHALMOLOGY
Payer: MEDICARE

## 2020-01-17 VITALS
RESPIRATION RATE: 18 BRPM | OXYGEN SATURATION: 96 % | BODY MASS INDEX: 21.03 KG/M2 | DIASTOLIC BLOOD PRESSURE: 76 MMHG | WEIGHT: 107.1 LBS | HEART RATE: 70 BPM | TEMPERATURE: 97.8 F | SYSTOLIC BLOOD PRESSURE: 123 MMHG

## 2020-01-17 LAB
EKG IMPRESSION: NORMAL
PATHOLOGY CONSULT NOTE: NORMAL

## 2020-01-17 PROCEDURE — 500558 HCHG EYE SHIELD W/GARTER (FOX): Performed by: OPHTHALMOLOGY

## 2020-01-17 PROCEDURE — 160002 HCHG RECOVERY MINUTES (STAT): Performed by: OPHTHALMOLOGY

## 2020-01-17 PROCEDURE — 160025 RECOVERY II MINUTES (STATS): Performed by: OPHTHALMOLOGY

## 2020-01-17 PROCEDURE — 160009 HCHG ANES TIME/MIN: Performed by: OPHTHALMOLOGY

## 2020-01-17 PROCEDURE — 160047 HCHG PACU  - EA ADDL 30 MINS PHASE II: Performed by: OPHTHALMOLOGY

## 2020-01-17 PROCEDURE — 93010 ELECTROCARDIOGRAM REPORT: CPT | Performed by: INTERNAL MEDICINE

## 2020-01-17 PROCEDURE — 160048 HCHG OR STATISTICAL LEVEL 1-5: Performed by: OPHTHALMOLOGY

## 2020-01-17 PROCEDURE — 88307 TISSUE EXAM BY PATHOLOGIST: CPT

## 2020-01-17 PROCEDURE — 160041 HCHG SURGERY MINUTES - EA ADDL 1 MIN LEVEL 4: Performed by: OPHTHALMOLOGY

## 2020-01-17 PROCEDURE — 700105 HCHG RX REV CODE 258: Performed by: OPHTHALMOLOGY

## 2020-01-17 PROCEDURE — 160029 HCHG SURGERY MINUTES - 1ST 30 MINS LEVEL 4: Performed by: OPHTHALMOLOGY

## 2020-01-17 PROCEDURE — 93005 ELECTROCARDIOGRAM TRACING: CPT | Performed by: OPHTHALMOLOGY

## 2020-01-17 PROCEDURE — 700101 HCHG RX REV CODE 250: Performed by: OPHTHALMOLOGY

## 2020-01-17 PROCEDURE — 160035 HCHG PACU - 1ST 60 MINS PHASE I: Performed by: OPHTHALMOLOGY

## 2020-01-17 PROCEDURE — 160046 HCHG PACU - 1ST 60 MINS PHASE II: Performed by: OPHTHALMOLOGY

## 2020-01-17 PROCEDURE — 160036 HCHG PACU - EA ADDL 30 MINS PHASE I: Performed by: OPHTHALMOLOGY

## 2020-01-17 PROCEDURE — 700105 HCHG RX REV CODE 258

## 2020-01-17 PROCEDURE — 700101 HCHG RX REV CODE 250

## 2020-01-17 PROCEDURE — 700111 HCHG RX REV CODE 636 W/ 250 OVERRIDE (IP)

## 2020-01-17 PROCEDURE — 500861 HCHG NEEDLE, RETROBULBAR (ALL): Performed by: OPHTHALMOLOGY

## 2020-01-17 PROCEDURE — A6410 STERILE EYE PAD: HCPCS | Performed by: OPHTHALMOLOGY

## 2020-01-17 PROCEDURE — 500448 HCHG DRESSING, TELFA 3X4: Performed by: OPHTHALMOLOGY

## 2020-01-17 RX ORDER — CEFAZOLIN SODIUM 1 G/3ML
INJECTION, POWDER, FOR SOLUTION INTRAMUSCULAR; INTRAVENOUS PRN
Status: DISCONTINUED | OUTPATIENT
Start: 2020-01-17 | End: 2020-01-17 | Stop reason: SURG

## 2020-01-17 RX ORDER — CIPROFLOXACIN HYDROCHLORIDE 3.5 MG/ML
2 SOLUTION/ DROPS TOPICAL 4 TIMES DAILY
COMMUNITY

## 2020-01-17 RX ORDER — BUPIVACAINE HYDROCHLORIDE 5 MG/ML
INJECTION, SOLUTION EPIDURAL; INTRACAUDAL
Status: DISCONTINUED
Start: 2020-01-17 | End: 2020-01-17 | Stop reason: HOSPADM

## 2020-01-17 RX ORDER — NEOMYCIN SULFATE, POLYMYXIN B SULFATE, BACITRACIN ZINC, HYDROCORTISONE 3.5; 10000; 400; 1 MG/G; [USP'U]/G; [USP'U]/G; MG/G
OINTMENT OPHTHALMIC 4 TIMES DAILY
COMMUNITY

## 2020-01-17 RX ORDER — ERYTHROMYCIN 5 MG/G
OINTMENT OPHTHALMIC
Status: DISCONTINUED | OUTPATIENT
Start: 2020-01-17 | End: 2020-01-17 | Stop reason: HOSPADM

## 2020-01-17 RX ORDER — GENTAMICIN SULFATE 40 MG/ML
INJECTION, SOLUTION INTRAMUSCULAR; INTRAVENOUS
Status: DISCONTINUED
Start: 2020-01-17 | End: 2020-01-17 | Stop reason: HOSPADM

## 2020-01-17 RX ORDER — SODIUM CHLORIDE, SODIUM LACTATE, POTASSIUM CHLORIDE, CALCIUM CHLORIDE 600; 310; 30; 20 MG/100ML; MG/100ML; MG/100ML; MG/100ML
INJECTION, SOLUTION INTRAVENOUS
Status: DISCONTINUED | OUTPATIENT
Start: 2020-01-17 | End: 2020-01-17 | Stop reason: SURG

## 2020-01-17 RX ORDER — HALOPERIDOL 5 MG/ML
1 INJECTION INTRAMUSCULAR
Status: DISCONTINUED | OUTPATIENT
Start: 2020-01-17 | End: 2020-01-17 | Stop reason: HOSPADM

## 2020-01-17 RX ORDER — DIPHENHYDRAMINE HYDROCHLORIDE 50 MG/ML
12.5 INJECTION INTRAMUSCULAR; INTRAVENOUS
Status: DISCONTINUED | OUTPATIENT
Start: 2020-01-17 | End: 2020-01-17 | Stop reason: HOSPADM

## 2020-01-17 RX ORDER — LIDOCAINE HYDROCHLORIDE AND EPINEPHRINE BITARTRATE 20; .01 MG/ML; MG/ML
INJECTION, SOLUTION SUBCUTANEOUS
Status: DISCONTINUED | OUTPATIENT
Start: 2020-01-17 | End: 2020-01-17 | Stop reason: HOSPADM

## 2020-01-17 RX ORDER — SODIUM CHLORIDE, SODIUM LACTATE, POTASSIUM CHLORIDE, CALCIUM CHLORIDE 600; 310; 30; 20 MG/100ML; MG/100ML; MG/100ML; MG/100ML
INJECTION, SOLUTION INTRAVENOUS CONTINUOUS
Status: DISCONTINUED | OUTPATIENT
Start: 2020-01-17 | End: 2020-01-17 | Stop reason: HOSPADM

## 2020-01-17 RX ORDER — BUPIVACAINE HYDROCHLORIDE 5 MG/ML
INJECTION, SOLUTION PERINEURAL
Status: DISCONTINUED | OUTPATIENT
Start: 2020-01-17 | End: 2020-01-17 | Stop reason: HOSPADM

## 2020-01-17 RX ORDER — ERYTHROMYCIN 5 MG/G
OINTMENT OPHTHALMIC
Status: DISCONTINUED
Start: 2020-01-17 | End: 2020-01-17 | Stop reason: HOSPADM

## 2020-01-17 RX ORDER — LEVOFLOXACIN 750 MG/1
750 TABLET, FILM COATED ORAL DAILY
COMMUNITY

## 2020-01-17 RX ORDER — ERYTHROMYCIN 5 MG/G
OINTMENT OPHTHALMIC 3 TIMES DAILY
COMMUNITY

## 2020-01-17 RX ORDER — ONDANSETRON 2 MG/ML
INJECTION INTRAMUSCULAR; INTRAVENOUS PRN
Status: DISCONTINUED | OUTPATIENT
Start: 2020-01-17 | End: 2020-01-17 | Stop reason: SURG

## 2020-01-17 RX ORDER — ONDANSETRON 2 MG/ML
4 INJECTION INTRAMUSCULAR; INTRAVENOUS
Status: DISCONTINUED | OUTPATIENT
Start: 2020-01-17 | End: 2020-01-17 | Stop reason: HOSPADM

## 2020-01-17 RX ORDER — LIDOCAINE HYDROCHLORIDE AND EPINEPHRINE BITARTRATE 20; .01 MG/ML; MG/ML
INJECTION, SOLUTION SUBCUTANEOUS
Status: DISCONTINUED
Start: 2020-01-17 | End: 2020-01-17 | Stop reason: HOSPADM

## 2020-01-17 RX ADMIN — ONDANSETRON 8 MG: 2 INJECTION INTRAMUSCULAR; INTRAVENOUS at 12:17

## 2020-01-17 RX ADMIN — SODIUM CHLORIDE, POTASSIUM CHLORIDE, SODIUM LACTATE AND CALCIUM CHLORIDE: 600; 310; 30; 20 INJECTION, SOLUTION INTRAVENOUS at 12:00

## 2020-01-17 RX ADMIN — CEFAZOLIN 2 G: 330 INJECTION, POWDER, FOR SOLUTION INTRAMUSCULAR; INTRAVENOUS at 12:16

## 2020-01-17 RX ADMIN — SODIUM CHLORIDE, POTASSIUM CHLORIDE, SODIUM LACTATE AND CALCIUM CHLORIDE: 600; 310; 30; 20 INJECTION, SOLUTION INTRAVENOUS at 12:28

## 2020-01-17 RX ADMIN — PROPOFOL 50 MG: 10 INJECTION, EMULSION INTRAVENOUS at 12:16

## 2020-01-17 RX ADMIN — SODIUM CHLORIDE, POTASSIUM CHLORIDE, SODIUM LACTATE AND CALCIUM CHLORIDE: 600; 310; 30; 20 INJECTION, SOLUTION INTRAVENOUS at 12:16

## 2020-01-17 SDOH — HEALTH STABILITY: MENTAL HEALTH: HOW OFTEN DO YOU HAVE A DRINK CONTAINING ALCOHOL?: NEVER

## 2020-01-17 NOTE — OR NURSING
1358 to pacu from or arrived asleep without airways dressing over l eye cdi. Report recvd from OR rn and Dr Salgado   1440 resting comfortably vss report called to [facility] and transport requested   1528 remains sleepy. Dr Salgado to bedside states pt ok to dc back to home/facility awaiting ems transport to arrive report to toni for break  1600- son and daughter in law here to visit with pt she is still sleepy reviewed all dc instructions with them. Qualifies for stage 2 transferred    1740 [facility] ems here to transport pt back to [facility] report given. Iv dcd  [facility] and copy of dc paperwork

## 2020-01-17 NOTE — ANESTHESIA QCDR
2019 [facility] Clinical Data Registry (for Quality Improvement)     Postoperative nausea/vomiting risk protocol (Adult = 18 yrs and Pediatric 3-17 yrs)- (430 and 463)  General inhalation anesthetic (NOT TIVA) with PONV risk factors: Yes  Provision of anti-emetic therapy with at least 2 different classes of agents: Yes   Patient DID NOT receive anti-emetic therapy and reason is documented in Medical Record:  N/A    Multimodal Pain Management- (477)  Non-emergent surgery AND patient age >= 18:   Use of Multimodal Pain Management, two or more drugs and/or interventions, NOT including systemic opioids:   Exception: Documented allergy to multiple classes of analgesics:     Smoking Abstinence (404)  Patient is current smoker (cigarette, pipe, e-cig, marijuanna):   Elective Surgery:   Abstinence instructions provided prior to day of surgery:   Patient abstained from smoking on day of surgery:     Pre-Op Beta-Blocker in Isolated CABG (44)  Isolated CABG AND patient age >= 18:   Beta-blocker admin within 24 hours of surgical incision:   Exception:of medical reason(s) for not administering beta blocker within 24 hours prior to surgical incision (e.g., not  indicated,other medical reason):     PACU assessment of acute postoperative pain prior to Anesthesia Care End- Applies to Patients Age = 18- (ABG7)  Initial PACU pain score is which of the following: < 7/10  Patient unable to report pain score: N/A    Post-anesthetic transfer of care checklist/protocol to PACU/ICU- (426 and 427)  Upon conclusion of case, patient transferred to which of the following locations: PACU/Non-ICU  Use of transfer checklist/protocol: Yes  Exclusion: Service Performed in Patient Hospital Room (and thus did not require transfer): N/A  Unplanned admission to ICU related to anesthesia service up through end of PACU care- (MD51)  Unplanned admission to ICU (not initially anticipated at anesthesia start time): No

## 2020-01-17 NOTE — ANESTHESIA PROCEDURE NOTES
Airway  Date/Time: 1/17/2020 12:18 PM  Performed by: Ten Salgado M.D.  Authorized by: Ten Salgado M.D.     Location:  OR  Urgency:  Elective  Indications for Airway Management:  Anesthesia  Spontaneous Ventilation: absent    Sedation Level:  Deep  Preoxygenated: Yes    Final Airway Type:  Supraglottic airway  Final Supraglottic Airway:  Standard LMA  SGA Size:  4  Number of Attempts at Approach:  1

## 2020-01-17 NOTE — ANESTHESIA POSTPROCEDURE EVALUATION
Patient: Aileen Reis    Procedure Summary     Date:  01/17/20 Room / Location:  [facility] ROOM 21 / SURGERY SAME DAY [facility]    Anesthesia Start:  1216 Anesthesia Stop:      Procedure:  ENUCLEATION, EYE - NO IMPLANT, WITH FROST SUTURES (Left Eye) Diagnosis:  (BLIND, PAINFUL EYE)    Surgeon:  Supa Webber M.D. Responsible Provider:  Ten Salgado M.D.    Anesthesia Type:  general ASA Status:  4          Final Anesthesia Type: general  Last vitals  BP   Blood Pressure : 114/67    Temp   37 °C (98.6 °F)    Pulse   Pulse: 70   Resp   (!) 0    SpO2   100 %      Anesthesia Post Evaluation    Patient location during evaluation: PACU  Patient participation: complete - patient participated  Level of consciousness: awake and alert    Airway patency: patent  Anesthetic complications: no  Cardiovascular status: hemodynamically stable  Respiratory status: acceptable  Hydration status: euvolemic    PONV: none

## 2020-01-17 NOTE — ANESTHESIA TIME REPORT
Anesthesia Start and Stop Event Times     Date Time Event    1/17/2020 1200 Ready for Procedure     1216 Anesthesia Start        Responsible Staff  01/17/20    Name Role Begin End    Ten Salgado M.D. Anesth 1228         Preop Diagnosis (Free Text):  Pre-op Diagnosis     BLIND, PAINFUL EYE        Preop Diagnosis (Codes):    Post op Diagnosis  Blind painful eye      Premium Reason  Non-Premium    Comments:

## 2020-01-17 NOTE — DISCHARGE PLANNING
Spoke with [facility] regarding patient's return transport. Nurse is to call [phone] and ask for Rose or Summer to arrange transport back to [facility]. Please give them at least an hour to travel here to pick her up.

## 2020-01-17 NOTE — DISCHARGE INSTRUCTIONS
ACTIVITY: Rest and take it easy for the first 24 hours.  A responsible adult is recommended to remain with you during that time.  It is normal to feel sleepy.  We encourage you to not do anything that requires balance, judgment or coordination.    MILD FLU-LIKE SYMPTOMS ARE NORMAL. YOU MAY EXPERIENCE GENERALIZED MUSCLE ACHES, THROAT IRRITATION, HEADACHE AND/OR SOME NAUSEA.    FOR 24 HOURS DO NOT:  Drive, operate machinery or run household appliances.  Drink beer or alcoholic beverages.   Make important decisions or sign legal documents.    SPECIAL INSTRUCTIONS: *keep eye patch/dressing on for 48 hours  Start eythromycin ointment three times a day after dressing removed   Ice ok to eye if needed for comfort   Eye shield to left eye if sleeping or grabbing at eye ok to not wear if it causes more problems to have on  **    DIET: To avoid nausea, slowly advance diet as tolerated, avoiding spicy or greasy foods for the first day.  Add more substantial food to your diet according to your physician's instructions.  Babies can be fed formula or breast milk as soon as they are hungry.  INCREASE FLUIDS AND FIBER TO AVOID CONSTIPATION.    SURGICAL DRESSING/BATHING: *keep clean and dry **    FOLLOW-UP APPOINTMENT:  A follow-up appointment should be arranged with your doctor in *one week **; call to schedule.    You should CALL YOUR PHYSICIAN if you develop:  Fever greater than 101 degrees F.  Pain not relieved by medication, or persistent nausea or vomiting.  Excessive bleeding (blood soaking through dressing) or unexpected drainage from the wound.  Extreme redness or swelling around the incision site, drainage of pus or foul smelling drainage.  Inability to urinate or empty your bladder within 8 hours.  Problems with breathing or chest pain.    You should call 911 if you develop problems with breathing or chest pain.  If you are unable to contact your doctor or surgical center, you should go to the nearest emergency room or  urgent care center.  Physician's telephone #: *[phone]**    If any questions arise, call your doctor.  If your doctor is not available, please feel free to call the Surgical Center at [phone].  The Center is open Monday through Friday from 7AM to 7PM.  You can also call the HEALTH HOTLINE open 24 hours/day, 7 days/week and speak to a nurse at [phone], or toll free at [phone].    A registered nurse may call you a few days after your surgery to see how you are doing after your procedure.    MEDICATIONS: Resume taking daily medication.  Take prescribed pain medication with food.  If no medication is prescribed, you may take non-aspirin pain medication if needed.  PAIN MEDICATION CAN BE VERY CONSTIPATING.  Take a stool softener or laxative such as senokot, pericolace, or milk of magnesia if needed.    Prescription given for *none **.  Last pain medication given at *none **.    If your physician has prescribed pain medication that includes Acetaminophen (Tylenol), do not take additional Acetaminophen (Tylenol) while taking the prescribed medication.    Depression / Suicide Risk    As you are discharged from this [facility] facility, it is important to learn how to keep safe from harming yourself.    Recognize the warning signs:  · Abrupt changes in personality, positive or negative- including increase in energy   · Giving away possessions  · Change in eating patterns- significant weight changes-  positive or negative  · Change in sleeping patterns- unable to sleep or sleeping all the time   · Unwillingness or inability to communicate  · Depression  · Unusual sadness, discouragement and loneliness  · Talk of wanting to die  · Neglect of personal appearance   · Rebelliousness- reckless behavior  · Withdrawal from people/activities they love  · Confusion- inability to concentrate     If you or a loved one observes any of these behaviors or has concerns about self-harm, here's what you can  do:  · Talk about it- your feelings and reasons for harming yourself  · Remove any means that you might use to hurt yourself (examples: pills, rope, extension cords, firearm)  · Get professional help from the community (Mental Health, Substance Abuse, psychological counseling)  · Do not be alone:Call your Safe Contact- someone whom you trust who will be there for you.  · Call your local CRISIS HOTLINE 258-6966 or 543-989-7306  · Call your local Children's Mobile Crisis Response Team [place] (457) 765-7372 or www.Proxly  · Call the toll free National Suicide Prevention Hotlines   · National Suicide Prevention Lifeline 366-572-CCAG (6684)  · National Hope Line Network 800-SUICIDE (540-5827)

## 2020-01-17 NOTE — OR NURSING
1530-Received handoff report from Toyin DECKER RN.  Dressing to left eye, C/D/I.  RR even, unlabored.  O2 on at 4L via NC to maintain O2 sats within range.  Sleeping    1540-Moving more, but not awakened    1600-Continues to move off & on, but not opening eyes.    1605-Family in to visit    1610-Handoff report to Toyin DECKER RN

## 2020-01-17 NOTE — ANESTHESIA PREPROCEDURE EVALUATION
Relevant Problems   PULMONARY   (+) Pneumonia      NEURO   (+) History of heart block s/p pacer      CARDIAC   (+) Atrial arrhythmia   (+) HTN (hypertension)   (+) Pacemaker         (+) Renal insufficiency      Other   (+) Acute respiratory failure with hypoxia (HCC)   (+) Dementia (HCC)   (+) Long term current use of antiarrhythmic drug   (+) Major neurocognitive disorder (HCC)   (+) Psychosis (HCC)   (+) Syncope       Physical Exam    Airway   Mallampati: II  TM distance: >3 FB  Neck ROM: full       Cardiovascular - normal exam  Rhythm: regular  Rate: normal  (-) murmur     Dental - normal exam         Pulmonary - normal exam  Breath sounds clear to auscultation     Abdominal    Neurological - normal exam                 Anesthesia Plan    ASA 4       Plan - general       Airway plan will be LMA        Induction: intravenous    Postoperative Plan: Postoperative administration of opioids is intended.    Pertinent diagnostic labs and testing reviewed    Informed Consent:    Anesthetic plan and risks discussed with patient.    Use of blood products discussed with: patient whom consented to blood products.

## 2020-01-18 NOTE — OP REPORT
DATE OF SERVICE:  01/17/2020    PREOPERATIVE DIAGNOSES:  1.  Endophthalmitis, leftt eye.  2.  Blind and painful eye, left.    POSTOPERATIVE DIAGNOSES:  1.  Endophthalmitis, leftt eye.  2.  Blind and painful eye, left..  3.  History of glaucoma, anterior chamber shunts x2, left eye    PROCEDURES PERFORMED:  1.  Enucleation of the left eye without ocular implant.  2.  Temporary tarsorrhaphy, left.    SURGEON:  Supa Webber MD    ASSISTANT:  None.    ANESTHESIA:  General (LMA).    ANESTHESIOLOGIST:  Ten Salgado MD    SPECIMENS:  Left eye.    COMPLICATIONS:  None.    IMPLANTS:  None.    ESTIMATED BLOOD LOSS:  Approximately 5 mL    INDICATIONS:  The patient is a 89-year-old female who was brought to our   office after the on-call ophthalmologist had seen the patient with concern for   a left endophthalmitis and the need for emergent enucleation of the left eye.    The patient is a 89-year-old individual with advanced dementia, currently   living in a skilled nursing facility.  It was noted by the family that the   patient likely began having pain in the left side near 01/01/2020.  The eye   had been dilated and becoming more and more painful and red with periorbital   edema and swelling over the last 2 weeks.  Therefore, the skilled nursing   facility elected to call the ophthalmologist on-call for evaluation.  After   seeing the patient and noticing the state of the eye, the on-call   ophthalmologist sent the patient to our office for further evaluation.  Upon   evaluation, it was noted that there was extensive periorbital edema and   erythema as reaction to the apparent infected cornea and ophthalmitis.  View   to the posterior surface of the eye were prohibited secondary to diffuse   intrastromal infiltrate of the left cornea.  The patient, in the clinic, was   disoriented and was on a stretcher the entire exam.  It was also noted that   given her anxiety, the EMT service had ensure her safety by securing  safety   straps and harnesses from her arms and legs to prevent her from hurting   herself and others.  After an extensive discussion with the family, it was   elected that it would be best to proceed to remove the left eye to remove any   nidus for infection and to make the patient more comfortable given the   impossible ability to receive sight of the left eye.  Per the family's report,   the patient has been blind in the left eye for many years prior to this   presentation.  The risks, benefits, and alternatives were discussed with the   patient's 2 sons who are her power of attorneys.  After discussion, they   elected to proceed with scheduling surgery to remove the left eye.    The patient was met in the preoperative holding area where the surgical site   was marked, H and P updated, consent was obtained.  The EMT service had given   the patient an anxiolytic prior to preoperative area where it was noted the   patient was not combative.  The family elected to proceed with the procedure.    DESCRIPTION OF PROCEDURE:  The patient was taken to the operating room and   placed in a supine position, where the anesthesiologist attached necessary   cardiopulmonary monitors.  General anesthesia was then delivered with a   laryngeal mask airway.  With the patient sedated, the bed was then turned 90   degrees.  A retrobulbar injection consisting of a 1:1 ratio of 2% lidocaine,   1:100,000 epinephrine and 0.5% bupivacaine with 1:200,000 epinephrine was then   injected retrobulbarly, intraconally prior to the procedure.  A similar local   injection was then placed in the bulbar conjunctiva.  The patient was then   prepped and draped in normal sterile fashion for oculoplastic surgery   following a timeout procedure.    A Ravinder speculum was then placed in the left upper eyelid as well as a   traction suture consisting of 5-0 Mersilene to the left lower eyelid given the   obliteration of the left lower eyelid fornix.  Johanna  scissors were then   used to create a 360-degree peritomy around the cornea.  At this time, it was   noted that there was an anterior chamber tube shunt existing in the   supratemporal and supramedial aspects of the globe.  The capsules were then   dissected out with Johanna scissors and the tube shunt plates were then   removed separately.  Muscle hooks were then used to isolate the 4 different   recti muscles, which were then bipolared and cut with Johanna scissors.  The   superior oblique and inferior oblique muscles were also isolated with muscle   hooks and cut with bipolar cautery.  At this time, the thin cornea that was   superolaterally had a spontaneous perforation and the intraocular contents   leaked out.  An Allis clamp was then used to place anterior traction of the   globe away from the orbit.  Stay pins were then placed posteriorly behind the   eye to isolate the optic nerve.  The optic nerve was then strummed with   enucleation scissors prior to being cut across the nerve.  The intraconal   aspect of Tenon's and intraconal fat was extremely fibrotic and indurated   given the extent of her inflammation.  It was also noted that the conjunctiva   was diffusely edematous and hyperemic.  The globe was then placed in formalin   for pathological specimen.  Malleable retractor was then placed deep in the   orbit to find the residual stump of the optic nerve.  Bipolar cautery was then   used to cauterize the optic nerve to prevent further bleeding and CSF leak.    A 5-0 Vicryl suture was then used to close the Tenon's in a simple interrupted   fashion.  The conjunctiva was then closed in a running locking fashion with a   7-0 Vicryl suture.  The left upper and lower eyelids were then injected with   the same 1:1 ratio of local.  A 5-0 nylon suture was then placed through a   bolster to the inferior lower eyelid exiting the lower eyelid margin, entering   the superior eyelid margin, exiting the anterior  lamella through a bolster,   and then back through a bolster to the anterior lamella exiting the upper   eyelid through the eyelid margin at the level of the area of the gray line and   then entering the left lower eyelid at the gray line and through the margin   and exiting the anterior lamella back through bolster.  This was then tied.    The patient was then cleaned and erythromycin ointment was then placed into   the interpalpebral fissure area.  Another 5 mL of local was then injected   retrobulbarly or in the orbit to aid with postoperative pain.  A Tegaderm   followed 3 eye pads and a pressure patch and Elastoplast were then placed upon   the left side to offer a pressure patch.  The patient was then awakened   without difficulty and taken to the PACU where she was then found to be stable   and later discharged to the skilled nursing facility through the EMT service.    I, Dr. Supa Webber, was present and performed the entirety of the procedure   without assistance.       ____________________________________     MD DB Dexter / AGUS    DD:  01/17/2020 21:49:45  DT:  01/17/2020 23:08:03    D#:  [id number]  Job#:  [id number]

## 2020-02-25 NOTE — DOCUMENTATION QUERY
[facility]                                                                       Query Response Note      PATIENT:               ROSANNE BOLTON  ACCT #:                  [id number]  MRN:                     [id number]  :                      [id number]  ADMIT DATE:       2020 10:53 AM  DISCH DATE:        2020 5:40 PM  RESPONDING  PROVIDER #:        [id number]           QUERY TEXT:    There is confliction on the operative report regarding the laterality of the eye procedure. Please clarify laterality;    The patient's Clinical Indicators include:  PREOPERATIVE DIAGNOSES:  1.  Endophthalmitis, right eye.  2.  Blind and painful eye, right.     POSTOPERATIVE DIAGNOSES:  1.  Endophthalmitis, right eye.  2.  Blind and painful eye, right.  3.  History of glaucoma, anterior chamber shunts x2.     PROCEDURES PERFORMED:  1.  Enucleation of the left eye without ocular implant.  2.  Temporary tarsorrhaphy, left.    SPECIMENS:  Left eye    INDICATIONS:  The patient is a 89-year-old female who was brought to our   office after the on-call ophthalmologist had seen the patient with concern for   a left endophthalmitis and the need for emergent enucleation of the left eye.  Options provided:   -- Right eye   -- [Left eye      Query created by: Tabitha Ross on 2020 12:04 PM    RESPONSE TEXT:    [Left eye          Electronically signed by:  TABATHA LANGE MD 2020 5:30 PM

## (undated) DEVICE — NEEDLE NON SAFETY 25 GA X 1 1/2 IN HYPO (100EA/BX)

## (undated) DEVICE — KIT  I.V. START (100EA/CA)

## (undated) DEVICE — GLOVE LITE HANDLE DISP. - (1/PK 80PK/CS)

## (undated) DEVICE — SUTURE 5-0 ETHILON P-3 18 (12PK/BX)"

## (undated) DEVICE — CLOSURE SKIN STRIP 1/2 X 4 IN - (STERI STRIP) (50/BX 4BX/CA)

## (undated) DEVICE — ELECTRODE 850 FOAM ADHESIVE - HYDROGEL RADIOTRNSPRNT (50/PK)

## (undated) DEVICE — PAD EYE GAUZE COVERED OVAL 1 5/8 X 2 5/8" STERILE"

## (undated) DEVICE — NEEDLE  NON-SAFETY 30GA X 3/4 IN (10EA/CA)

## (undated) DEVICE — SUTURE 5-0 PROLENE P-3 - (12/BX)

## (undated) DEVICE — GOWN SURGEONS X-LARGE - DISP. (30/CA)

## (undated) DEVICE — CANISTER SUCTION 3000ML MECHANICAL FILTER AUTO SHUTOFF MEDI-VAC NONSTERILE LF DISP  (40EA/CA)

## (undated) DEVICE — Device

## (undated) DEVICE — CATHETER IV 20 GA X 1-1/4 ---SURG.& SDS ONLY--- (50EA/BX)

## (undated) DEVICE — TUBING CLEARLINK DUO-VENT - C-FLO (48EA/CA)

## (undated) DEVICE — APPLICATOR COTTONTIP 3 IN - STERILE (10EA/PK 100PK/CA)

## (undated) DEVICE — SET EXTENSION WITH 2 PORTS (48EA/CA) ***PART #2C8610 IS A SUBSTITUTE*****

## (undated) DEVICE — GLOVE BIOGEL PI INDICATOR SZ 7.0 SURGICAL PF LF - (50/BX 4BX/CA)

## (undated) DEVICE — SHIELD OPTH AL GRTR CVR FOX (50EA/BX)

## (undated) DEVICE — SYRINGE SAFETY 10 ML 18 GA X 1 1/2 BLUNT LL (100/BX 4BX/CA)

## (undated) DEVICE — WATER IRRIGATION STERILE 1000ML (12EA/CA)

## (undated) DEVICE — SET LEADWIRE 5 LEAD BEDSIDE DISPOSABLE ECG (1SET OF 5/EA)

## (undated) DEVICE — SUTURE 5-0 MERSIL S-14 (12PK/BX)

## (undated) DEVICE — TUBE CONNECT SUCTION CLEAR 120 X 1/4" (50EA/CA)"

## (undated) DEVICE — BANDAGE TENSOPLAST 3 X 5 YDS - (1/EA)

## (undated) DEVICE — LACTATED RINGERS INJ 1000 ML - (14EA/CA 60CA/PF)

## (undated) DEVICE — SODIUM CHL IRRIGATION 0.9% 1000ML (12EA/CA)

## (undated) DEVICE — GLOVE BIOGEL PI INDICATOR SZ 7.5 SURGICAL PF LF -(50/BX 4BX/CA)

## (undated) DEVICE — SUTURE 7-0 VICRYL TG140-8 (12PK/BX)

## (undated) DEVICE — TUBE CONNECTING SUCTION - CLEAR PLASTIC STERILE 72 IN (50EA/CA)

## (undated) DEVICE — ADHESIVE MASTISOL - (48/BX)

## (undated) DEVICE — PENCIL ELECTSURG 10FT BTN SWH - (50/CA)

## (undated) DEVICE — SUCTION INSTRUMENT YANKAUER BULBOUS TIP W/O VENT (50EA/CA)

## (undated) DEVICE — GLOVE SZ 6.5 BIOGEL PI MICRO - PF LF (50PR/BX)

## (undated) DEVICE — KIT ANESTHESIA W/CIRCUIT & 3/LT BAG W/FILTER (20EA/CA)

## (undated) DEVICE — GLOVE SZ 7 BIOGEL PI MICRO - PF LF (50PR/BX 4BX/CA)

## (undated) DEVICE — SENSOR SPO2 NEO LNCS ADHESIVE (20/BX) SEE USER NOTES

## (undated) DEVICE — SUTURE 5-0 VICRYL D/A S-14 18 (12PK/BX)"

## (undated) DEVICE — GLOVE BIOGEL SZ 7 SURGICAL PF LTX - (50PR/BX 4BX/CA)

## (undated) DEVICE — DRESSING NON ADHERENT 3 X 4 - STERILE (100/BX 12BX/CA)

## (undated) DEVICE — CANISTER SUCTION RIGID RED 1500CC (40EA/CA)

## (undated) DEVICE — NEEDLE RETROBULBAR ATKINSON 0.60 X 38MM 23GAX1-1/2 (10EA/SP)